# Patient Record
Sex: FEMALE | Race: BLACK OR AFRICAN AMERICAN
[De-identification: names, ages, dates, MRNs, and addresses within clinical notes are randomized per-mention and may not be internally consistent; named-entity substitution may affect disease eponyms.]

---

## 2016-06-29 VITALS — DIASTOLIC BLOOD PRESSURE: 74 MMHG | SYSTOLIC BLOOD PRESSURE: 138 MMHG

## 2017-02-13 ENCOUNTER — HOSPITAL ENCOUNTER (OUTPATIENT)
Dept: HOSPITAL 61 - KCIC MAMMO | Age: 62
Discharge: HOME | End: 2017-02-13
Attending: NURSE PRACTITIONER
Payer: COMMERCIAL

## 2017-02-13 DIAGNOSIS — Z12.31: Primary | ICD-10-CM

## 2017-02-13 NOTE — [facility]
Bilateral digital screening mammograms with CAD: 

HISTORY 

 

COMPARISON 

Comparison is made to previous studies dated 02/04/2016 and 02/10/2015. 

FINDINGS 

Breast density category B. 

The skin and nipples show no abnormalities. No abnormal lymph nodes are 

seen in the axilla. The breast parenchyma shows scattered fibroglandular 

density. There continue be calcified and calcify nodules bilaterally 

consistent with degenerating fibroadenoma. There are no new dominant 

masses, suspicious calcifications or architectural distortions. Benign 

appearing calcifications are present 

IMPRESSION 

No evidence of malignancy. Recommend routine annual mammographic 

screening. 

This study was interpreted with the benefit of Computerized Aided 

Detection (CAD). 

Mammography is not 100% sensitive in detecting breast cancer. Therefore, a

self breast exam and a clinical breast exam are very important. A negative

mammogram does not negate a clinically suspicious finding and should not 

result in a delay in biopsying a clinically suspicious abnormality. 

BI-RADS category 2. Benign. 

This patient's information has been entered into a reminder system for the

patient to be notified with the results of this examination and a target 

date for her next mammograms. 

 

Electronically signed by: Annmarie Lo MD (Feb 13, 2017 17:10:17)

## 2017-07-18 ENCOUNTER — HOSPITAL ENCOUNTER (EMERGENCY)
Dept: HOSPITAL 61 - ER | Age: 62
Discharge: HOME | End: 2017-07-18
Payer: COMMERCIAL

## 2017-07-18 VITALS — HEIGHT: 63 IN | WEIGHT: 235 LBS | BODY MASS INDEX: 41.64 KG/M2

## 2017-07-18 VITALS
SYSTOLIC BLOOD PRESSURE: 148 MMHG | SYSTOLIC BLOOD PRESSURE: 148 MMHG | DIASTOLIC BLOOD PRESSURE: 76 MMHG | DIASTOLIC BLOOD PRESSURE: 76 MMHG

## 2017-07-18 DIAGNOSIS — E11.9: ICD-10-CM

## 2017-07-18 DIAGNOSIS — Z88.5: ICD-10-CM

## 2017-07-18 DIAGNOSIS — I10: ICD-10-CM

## 2017-07-18 DIAGNOSIS — Z96.659: ICD-10-CM

## 2017-07-18 DIAGNOSIS — F41.9: ICD-10-CM

## 2017-07-18 DIAGNOSIS — E78.00: ICD-10-CM

## 2017-07-18 DIAGNOSIS — G44.209: Primary | ICD-10-CM

## 2017-07-18 PROCEDURE — 96374 THER/PROPH/DIAG INJ IV PUSH: CPT

## 2017-07-18 PROCEDURE — 96361 HYDRATE IV INFUSION ADD-ON: CPT

## 2017-07-18 PROCEDURE — 99285 EMERGENCY DEPT VISIT HI MDM: CPT

## 2017-07-18 PROCEDURE — 96375 TX/PRO/DX INJ NEW DRUG ADDON: CPT

## 2017-07-18 NOTE — PHYS DOC
Past Medical History


Past Medical History:  Anxiety, Diabetes-Type II, High Cholesterol, Hypertension


Past Surgical History:  Knee Replacement, Other


Additional Past Surgical Histo:  RIGHT KNEE SX, R&L BREAST BX; left knee


Alcohol Use:  Rarely


Drug Use:  None





Adult General


Chief Complaint


Chief Complaint:  HEADACHE





HPI


HPI





Is a pleasant 62-year-old female with a week long history of continuous 

headache. She describes a headache as throbbing and aching beginning at the 

base of her neck with radiation to the top of her scalp and temples bilaterally 

is not worse of life or sudden onset in nature. She denies any problems with 

vision, problems with speech, weakness in her upper or lower extremities, 

denies any numbness and tingling in her upper and lower extremities denies any 

direct trauma. Patient also denies any URI symptoms like runny nose cough 

congestion or ear pain ear drainage tinnitus or eye discomfort. Patient was 

seen by an ophthalmologist recently who prescribed an eyedrop for her. Her 

symptoms are not improved with his therapy. She denies any fevers, denies any 

chills, denies any nausea she does have some photophobia and audiophobia which 

does increase her pain. The pain is worsened by exertion and bending over. Pain 

presently is a 10 of 10. She mentioned in her history that her provider wanted 

to complete an MRI study of her brain although she had not gotten approval 

through his insurance company. She was hoping that they could be done today.





Review of Systems


Review of Systems





Constitutional: Denies fever or chills []


Eyes: Denies change in visual acuity, redness, or eye pain []


HENT: Denies nasal congestion or sore throat []


Respiratory: Denies cough or shortness of breath []


Cardiovascular: No additional information not addressed in HPI []


GI: Denies abdominal pain, denies vomiting bloody stools or diarrhea. But she 

does complain of intermittent nausea


: Denies dysuria or hematuria []


Musculoskeletal: Denies back pain or joint pain []


Integument: Denies rash or skin lesions []


Neurologic: sHe does complain of headache


Endocrine: Denies polyuria or polydipsia []





Current Medications


Current Medications





Current Medications








 Medications


  (Trade)  Dose


 Ordered  Sig/Faraz  Start Time


 Stop Time Status Last Admin


Dose Admin


 


 Dexamethasone


 Sodium Phosphate


  (Decadron)  10 mg  1X  ONCE  7/18/17 21:45


 7/18/17 21:50 DC 7/18/17 22:10


10 MG


 


 Diphenhydramine


 HCl


  (Benadryl)  50 mg  1X  ONCE  7/18/17 21:45


 7/18/17 21:50 DC 7/18/17 22:10


50 MG


 


 Ketorolac


 Tromethamine


  (Toradol)  30 mg  1X  ONCE  7/18/17 21:45


 7/18/17 21:50 DC 7/18/17 22:10


30 MG


 


 Ondansetron HCl


  (Zofran)  4 mg  1X  ONCE  7/18/17 21:45


 7/18/17 21:50 DC 7/18/17 22:10


4 MG


 


 Sodium Chloride  1,000 ml @ 


 1,000 mls/hr  1X  ONCE  7/18/17 21:45


 7/18/17 22:44 DC 7/18/17 22:10


1,000 MLS/HR











Allergies


Allergies





Allergies








Coded Allergies Type Severity Reaction Last Updated Verified


 


  codeine Adverse Reaction Intermediate VOMITING 6/9/15 No











Physical Exam


Physical Exam


This patient's vital signs were reviewed by me it is documented that she is 

mildly hypertensive but this is chronic in nature for this patient otherwise 

normal vital signs.


Constitutional: Well developed, well nourished, no acute distress, non-toxic 

appearance. []


HENT: Normocephalic, atraumatic, bilateral external ears normal, oropharynx 

moist, no oral exudates, nose normal. []


Eyes: PERRLA, EOMI, conjunctiva normal, no discharge. She does demonstrate 

arcus senilis but no mid-fixed pupil dilation patient has no tenderness to 

palpation over the temporal arteries.


Neck: Normal range of motion, supple, no stridor. She does have tenderness to 

palpation over the trapezius as it inserts onto the base of the skull on the 

left. It's pain is exactly is his experience in the past. There is no midline 

tenderness palpation [] 


Cardiovascular:Heart rate regular rhythm, no murmur []


Lungs & Thorax:  Bilateral breath sounds clear to auscultation []


Skin: Warm, dry, no erythema, no rash. [] 


Back: No tenderness, no CVA tenderness. [] 


Extremities: No tenderness, no cyanosis, no clubbing, ROM intact, no edema. [] 


Neurologic: Alert and oriented X 3, normal motor function, normal sensory 

function, no focal deficits noted. []


Psychologic: Affect normal, judgement normal, mood normal. []





Current Patient Data


Vital Signs





 Vital Signs








  Date Time  Temp Pulse Resp B/P (MAP) Pulse Ox O2 Delivery O2 Flow Rate FiO2


 


7/18/17 23:00  75 20 148/76 (100) 99 Room Air  


 


7/18/17 21:33 98.4       





 98.4       











Radiology/Procedures


Radiology/Procedures


[]





Course & Med Decision Making


Course & Med Decision Making


Pertinent Labs and Imaging studies reviewed. (See chart for details)


Upon patient arrival patient's history and physical exam revealed probably a 

tension-like headache. This is not worse of life and sudden onset she is a 

normal neuro exam initial presentation. Patient denied discussed needing an MRI 

at this time that she was going to get completed at this time. I believe there 

is no clinical indication for emergently during her ER visit today. Patient has 

agreed to allow me to try some IV medications nonnarcotic in nature to address 

her headache.





10 PM she has just received the medications





10:54 PM patient's are equal and markedly better. She is agreed to follow up 

with her primary care doctor to schedule attempted outpatient MRI and referral 

to neurology for her headaches. Again I do not believe she is suffering from an 

encephalopathy, or meningitis or infection of the CNS. I do not believe she is 

having aneurysm, subarachnoid hemorrhage, intracranial tumor, doubt glaucoma, 

temporal arteritis, trauma, subdural hematoma, patient does not demonstrate any 

signs of sinusitis or otitis media otitis externa or other ENT infection.





Impression: Tension headache, high blood pressure


Disposition: PCP follow-up with referral to neurology.


[]





Dragon Disclaimer


Dragon Disclaimer


This electronic medical record was generated, in whole or in part, using a 

voice recognition dictation system.





Departure


Departure


Impression:  


 Primary Impression:  


 Anxiety


 Additional Impression:  


 Tension headache


Disposition:  01 HOME, SELF-CARE


Condition:  IMPROVED


Referrals:  


NIRAV RUBIN (PCP)


Patient Instructions:  Hypertension, Tension Headache





Additional Instructions:  


Please return for any new or increasing symptoms, new focal neurologic deficits 

or if you have any questions or concerns.


Scripts


Ondansetron (ZOFRAN ODT) 4 Mg Tab.rapdis


4 MG PO BID Y for NAUSEA/VOMITING for 5 Days, #10 TAB


   Prov: FERMIN SLOAN MD         7/18/17 


Naproxen (NAPROSYN) 500 Mg Tablet


1 TAB PO BID, #14 TAB 1 Refill


   Prov: FERMIN SLOAN MD         7/18/17 


Diphenhydramine Hcl (BENADRYL) 25 Mg Capsule


1 CAP PO QHS, #30 CAP 1 Refill


   Prov: FERMIN SLOAN MD         7/18/17





Problem Qualifiers











FERMIN SLOAN MD Jul 18, 2017 22:59

## 2017-07-25 ENCOUNTER — HOSPITAL ENCOUNTER (OUTPATIENT)
Dept: HOSPITAL 61 - KCIC MRI | Age: 62
Discharge: HOME | End: 2017-07-25
Attending: FAMILY MEDICINE
Payer: COMMERCIAL

## 2017-07-25 DIAGNOSIS — R51: Primary | ICD-10-CM

## 2017-07-25 DIAGNOSIS — M54.12: ICD-10-CM

## 2017-07-25 PROCEDURE — 70551 MRI BRAIN STEM W/O DYE: CPT

## 2017-07-25 NOTE — [facility]
MRI of the brain without contrast 7/25/2017

 

Clinical History: Headache and neck pain for 2 weeks.

 

Technique: Unenhanced T1-weighted sagittal and axial, T2-weighted axial 

and coronal and FLAIR, gradient echo and diffusion-weighted axial images 

of the brain were obtained.

 

Findings: No previous imaging studies are available for comparison.

 

There is generalized parenchymal atrophy. Patchy and several small 

scattered areas of increased signal intensity are seen within the 

periventricular and subcortical white matter of both cerebral hemispheres 

on the FLAIR and T2-weighted images consistent with areas of mild small 

vessel ischemic disease. No acute parenchymal abnormality is seen. No 

extra-axial fluid collection is seen. There is no MRI evidence of acute 

ischemia/infarction.

 

A 2 cm mucous retention cyst is seen involving the left maxillary sinus. A

7 mm mucous retention cyst is seen involving the sphenoid sinus. Mild 

mucosal thickening in seen scattered throughout the paranasal sinuses. 

There are minimal bilateral mastoid effusions. Normal flow voids are seen 

within the major vascular structures surrounding the brain parenchyma.

 

Impression: No acute parenchymal abnormality is seen.

 

Electronically signed by: Ronnie Holcomb MD (7/25/2017 4:20 PM) [facility]

## 2017-10-29 ENCOUNTER — HOSPITAL ENCOUNTER (EMERGENCY)
Dept: HOSPITAL 61 - ER | Age: 62
Discharge: HOME | End: 2017-10-29
Payer: COMMERCIAL

## 2017-10-29 VITALS
DIASTOLIC BLOOD PRESSURE: 80 MMHG | SYSTOLIC BLOOD PRESSURE: 135 MMHG | DIASTOLIC BLOOD PRESSURE: 80 MMHG | SYSTOLIC BLOOD PRESSURE: 135 MMHG

## 2017-10-29 VITALS — WEIGHT: 235 LBS | HEIGHT: 63 IN | BODY MASS INDEX: 41.64 KG/M2

## 2017-10-29 DIAGNOSIS — F41.9: ICD-10-CM

## 2017-10-29 DIAGNOSIS — H57.12: Primary | ICD-10-CM

## 2017-10-29 DIAGNOSIS — Z88.5: ICD-10-CM

## 2017-10-29 DIAGNOSIS — E78.00: ICD-10-CM

## 2017-10-29 DIAGNOSIS — E11.36: ICD-10-CM

## 2017-10-29 DIAGNOSIS — I10: ICD-10-CM

## 2017-10-29 PROCEDURE — 99283 EMERGENCY DEPT VISIT LOW MDM: CPT

## 2017-10-29 NOTE — PHYS DOC
Past Medical History


Past Medical History:  Anxiety, Diabetes-Type II, High Cholesterol, Hypertension

, Other


Additional Past Medical Histor:  cataracts


Past Surgical History:  Knee Replacement, Other


Additional Past Surgical Histo:  RIGHT KNEE SX, R&L BREAST BX; left knee


Alcohol Use:  Rarely


Drug Use:  None





Adult General


Chief Complaint


Chief Complaint:  EYE PROBLEMS





HPI


HPI





Patient is a 62  year old email who presents with left eye pain onset 2 hours 

prior to arrival questionable decreased vision but was able to drive herself 

here. Sees an eye doctor and takes antihistamine drops when necessary. Hasn't 

taken them for several months. Denies eye drainage or trauma.





Review of Systems


Review of Systems





Constitutional: Denies fever or chills []


Eyes: Denies change in visual acuity, redness, or eye pain []


HENT: Denies nasal congestion or sore throat []


Respiratory: Denies cough or shortness of breath []


Cardiovascular: No additional information not addressed in HPI []


GI: Denies abdominal pain, nausea, vomiting, bloody stools or diarrhea []


: Denies dysuria or hematuria []


Musculoskeletal: Denies back pain or joint pain []


Integument: Denies rash or skin lesions []


Neurologic: Denies headache, focal weakness or sensory changes []


Endocrine: Denies polyuria or polydipsia []





Current Medications


Current Medications





Current Medications








 Medications


  (Trade)  Dose


 Ordered  Sig/Faraz  Start Time


 Stop Time Status Last Admin


Dose Admin


 


 Fluorescein Sodium


  (Ful-Samantha)  1 strip  STK-MED ONCE  10/29/17 04:07


 10/29/17 04:08 DC  


 


 


 Tetracaine HCl


  (Tetracaine)  1 drop  1X  ONCE  10/29/17 04:30


 10/29/17 04:31  10/29/17 04:09


1 DROP











Allergies


Allergies





Allergies








Coded Allergies Type Severity Reaction Last Updated Verified


 


  codeine Adverse Reaction Intermediate VOMITING 6/9/15 No











Physical Exam


Physical Exam





Constitutional: Well developed, well nourished, no acute distress, non-toxic 

appearance. []


HENT: Normocephalic, atraumatic, bilateral external ears normal, oropharynx 

moist, no oral exudates, nose normal. []


Eyes: PERRLA, EOMI, conjunctiva normal, no discharge. Left eye shows an 

injected conjunctiva. Intraocular pressures were measured normal at 14 and 

equal. Fluorescein strip and dye instilled no uptake. Anterior chamber was 

clear no foreign body seen. Patient's symptoms resolved when the numbing 

medicine when in. No photophobia. Able to count fingers.[] 


Neck: Normal range of motion, no tenderness, supple, no stridor. [] 


Cardiovascular:Heart rate regular rhythm, no murmur []


Lungs & Thorax:  Bilateral breath sounds clear to auscultation []


Abdomen: Bowel sounds normal, soft, no tenderness, no masses, no pulsatile 

masses. [] 


Skin: Warm, dry, no erythema, no rash. [] 


Back: No tenderness, no CVA tenderness. [] 


Extremities: No tenderness, no cyanosis, no clubbing, ROM intact, no edema. [] 


Neurologic: Alert and oriented X 3, normal motor function, normal sensory 

function, no focal deficits noted. []


Psychologic: Affect normal, judgement normal, mood normal. []





Current Patient Data


Vital Signs





 Vital Signs








  Date Time  Temp Pulse Resp B/P (MAP) Pulse Ox O2 Delivery O2 Flow Rate FiO2


 


10/29/17 03:35 98.4 88 22  98 Room Air  





 98.4       











EKG


EKG


[]





Radiology/Procedures


Radiology/Procedures


[]





Course & Med Decision Making


Course & Med Decision Making


Pertinent Labs and Imaging studies reviewed. (See chart for details)





Patient has diabetes this may represent retinopathy or floaters and I recommend 

she get follow-up no later than Monday with her primary eye doctor.[]





Dragon Disclaimer


Dragon Disclaimer


This electronic medical record was generated, in whole or in part, using a 

voice recognition dictation system.





Departure


Departure


Impression:  


 Primary Impression:  


 Left eye pain


Disposition:  01 HOME, SELF-CARE


Condition:  STABLE


Referrals:  


GAB KEE MD (PCP)











DAVID HA MD Oct 29, 2017 04:18

## 2017-11-16 ENCOUNTER — HOSPITAL ENCOUNTER (OUTPATIENT)
Dept: HOSPITAL 61 - LAB | Age: 62
Discharge: HOME | End: 2017-11-16
Attending: PSYCHIATRY & NEUROLOGY
Payer: COMMERCIAL

## 2017-11-16 DIAGNOSIS — G43.019: Primary | ICD-10-CM

## 2017-11-16 LAB
ALBUMIN SERPL-MCNC: 3.8 G/DL (ref 3.4–5)
ALBUMIN/GLOB SERPL: 1 {RATIO} (ref 1–1.7)
ALP SERPL-CCNC: 112 U/L (ref 46–116)
ALT SERPL-CCNC: 24 U/L (ref 14–59)
ANION GAP SERPL CALC-SCNC: 6 MMOL/L (ref 6–14)
ANISOCYTOSIS BLD QL SMEAR: SLIGHT
AST SERPL-CCNC: 12 U/L (ref 15–37)
BASOPHILS # BLD AUTO: 0 X10^3/UL (ref 0–0.2)
BASOPHILS NFR BLD: 1 % (ref 0–3)
BILIRUB SERPL-MCNC: 0.4 MG/DL (ref 0.2–1)
BUN SERPL-MCNC: 13 MG/DL (ref 7–20)
BUN/CREAT SERPL: 19 (ref 6–20)
CALCIUM SERPL-MCNC: 9.5 MG/DL (ref 8.5–10.1)
CHLORIDE SERPL-SCNC: 105 MMOL/L (ref 98–107)
CO2 SERPL-SCNC: 30 MMOL/L (ref 21–32)
CREAT SERPL-MCNC: 0.7 MG/DL (ref 0.6–1)
EOSINOPHIL NFR BLD: 2 % (ref 0–3)
ERYTHROCYTE [DISTWIDTH] IN BLOOD BY AUTOMATED COUNT: 18.7 % (ref 11.5–14.5)
GFR SERPLBLD BASED ON 1.73 SQ M-ARVRAT: 101.9 ML/MIN
GLOBULIN SER-MCNC: 4 G/DL (ref 2.2–3.8)
GLUCOSE SERPL-MCNC: 141 MG/DL (ref 70–99)
HCT VFR BLD CALC: 35.3 % (ref 36–47)
HGB BLD-MCNC: 10.8 G/DL (ref 12–15.5)
HYPOCHROMIA BLD QL SMEAR: (no result)
LYMPHOCYTES # BLD: 3.8 X10^3/UL (ref 1–4.8)
LYMPHOCYTES NFR BLD AUTO: 52 % (ref 24–48)
MCH RBC QN AUTO: 21 PG (ref 25–35)
MCHC RBC AUTO-ENTMCNC: 31 G/DL (ref 31–37)
MCV RBC AUTO: 69 FL (ref 79–100)
MICROCYTES BLD QL SMEAR: (no result)
MONOCYTES NFR BLD: 7 % (ref 0–9)
NEUTROPHILS NFR BLD AUTO: 39 % (ref 31–73)
OVALOCYTES BLD QL SMEAR: (no result)
PLATELET # BLD AUTO: 223 X10^3/UL (ref 140–400)
PLATELET # BLD EST: ADEQUATE 10*3/UL
POIKILOCYTOSIS BLD QL SMEAR: SLIGHT
POTASSIUM SERPL-SCNC: 3.7 MMOL/L (ref 3.5–5.1)
PROT SERPL-MCNC: 7.8 G/DL (ref 6.4–8.2)
RBC # BLD AUTO: 5.15 X10^6/UL (ref 3.5–5.4)
SODIUM SERPL-SCNC: 141 MMOL/L (ref 136–145)
TARGETS BLD QL SMEAR: (no result)
WBC # BLD AUTO: 7.3 X10^3/UL (ref 4–11)

## 2017-11-16 PROCEDURE — 85025 COMPLETE CBC W/AUTO DIFF WBC: CPT

## 2017-11-16 PROCEDURE — 85651 RBC SED RATE NONAUTOMATED: CPT

## 2017-11-16 PROCEDURE — 80053 COMPREHEN METABOLIC PANEL: CPT

## 2017-11-16 PROCEDURE — 36415 COLL VENOUS BLD VENIPUNCTURE: CPT

## 2018-04-10 ENCOUNTER — HOSPITAL ENCOUNTER (OUTPATIENT)
Dept: HOSPITAL 61 - KCIC MAMMO | Age: 63
Discharge: HOME | End: 2018-04-10
Attending: FAMILY MEDICINE
Payer: COMMERCIAL

## 2018-04-10 DIAGNOSIS — Z12.31: Primary | ICD-10-CM

## 2018-04-10 PROCEDURE — 77067 SCR MAMMO BI INCL CAD: CPT

## 2018-07-26 ENCOUNTER — HOSPITAL ENCOUNTER (EMERGENCY)
Dept: HOSPITAL 61 - ER | Age: 63
LOS: 1 days | Discharge: HOME | End: 2018-07-27
Payer: COMMERCIAL

## 2018-07-26 DIAGNOSIS — R00.2: Primary | ICD-10-CM

## 2018-07-26 DIAGNOSIS — E11.9: ICD-10-CM

## 2018-07-26 DIAGNOSIS — Z88.5: ICD-10-CM

## 2018-07-26 DIAGNOSIS — I10: ICD-10-CM

## 2018-07-26 PROCEDURE — 99285 EMERGENCY DEPT VISIT HI MDM: CPT

## 2018-07-26 PROCEDURE — 82553 CREATINE MB FRACTION: CPT

## 2018-07-26 PROCEDURE — 36415 COLL VENOUS BLD VENIPUNCTURE: CPT

## 2018-07-26 PROCEDURE — 80053 COMPREHEN METABOLIC PANEL: CPT

## 2018-07-26 PROCEDURE — 85025 COMPLETE CBC W/AUTO DIFF WBC: CPT

## 2018-07-26 PROCEDURE — 83735 ASSAY OF MAGNESIUM: CPT

## 2018-07-26 PROCEDURE — 93005 ELECTROCARDIOGRAM TRACING: CPT

## 2018-07-26 PROCEDURE — 84484 ASSAY OF TROPONIN QUANT: CPT

## 2018-07-27 VITALS — DIASTOLIC BLOOD PRESSURE: 61 MMHG | SYSTOLIC BLOOD PRESSURE: 133 MMHG

## 2018-07-27 LAB
ADD MAN DIFF?: NO
ALBUMIN SERPL-MCNC: 3.6 G/DL (ref 3.4–5)
ALBUMIN/GLOB SERPL: 0.9 {RATIO} (ref 1–1.7)
ALP SERPL-CCNC: 100 U/L (ref 46–116)
ALT (SGPT): 21 U/L (ref 14–59)
ANION GAP SERPL CALC-SCNC: 7 MMOL/L (ref 6–14)
ANISOCYTOSIS: (no result)
AST SERPL-CCNC: 14 U/L (ref 15–37)
BASO #: 0.1 X10^3/UL (ref 0–0.2)
BASO %: 1 % (ref 0–3)
BLOOD UREA NITROGEN: 16 MG/DL (ref 7–20)
BUN/CREAT SERPL: 23 (ref 6–20)
CALCIUM: 9.7 MG/DL (ref 8.5–10.1)
CHLORIDE: 105 MMOL/L (ref 98–107)
CK SERPL-CCNC: 131 U/L (ref 26–192)
CKMB INDEX: (no result) % (ref 0–4)
CKMB MASS: < 0.5 NG/ML (ref 0–3.6)
CO2 SERPL-SCNC: 31 MMOL/L (ref 21–32)
CREAT SERPL-MCNC: 0.7 MG/DL (ref 0.6–1)
EOS #: 0.2 X10^3/UL (ref 0–0.7)
EOS %: 2 % (ref 0–3)
GFR SERPLBLD BASED ON 1.73 SQ M-ARVRAT: 101.6 ML/MIN
GLOBULIN SER-MCNC: 4 G/DL (ref 2.2–3.8)
GLUCOSE SERPL-MCNC: 135 MG/DL (ref 70–99)
HCG SERPL-ACNC: 8 X10^3/UL (ref 4–11)
HEMATOCRIT: 34.2 % (ref 36–47)
HEMOGLOBIN: 10.8 G/DL (ref 12–15.5)
HYPOCHROMIA: (no result)
LYMPH #: 3.8 X10^3/UL (ref 1–4.8)
LYMPH %: 47 % (ref 24–48)
MAGNESIUM: 2 MG/DL (ref 1.8–2.4)
MEAN CORPUSCULAR HEMOGLOBIN: 21 PG (ref 25–35)
MEAN CORPUSCULAR HGB CONC: 32 G/DL (ref 31–37)
MEAN CORPUSCULAR VOLUME: 67 FL (ref 79–100)
MICROCYTOSIS: (no result)
MONO #: 0.5 X10^3/UL (ref 0–1.1)
MONO %: 6 % (ref 0–9)
NEUT #: 3.5 X10^3UL (ref 1.8–7.7)
NEUT %: 44 % (ref 31–73)
PLATELET COUNT: 229 X10^3/UL (ref 140–400)
PLT ESTIMATE: ADEQUATE
POTASSIUM SERPL-SCNC: 3.9 MMOL/L (ref 3.5–5.1)
RED BLOOD COUNT: 5.07 X10^6/UL (ref 3.5–5.4)
RED CELL DISTRIBUTION WIDTH: 20.1 % (ref 11.5–14.5)
SODIUM: 143 MMOL/L (ref 136–145)
TOTAL BILIRUBIN: 0.2 MG/DL (ref 0.2–1)
TOTAL PROTEIN: 7.6 G/DL (ref 6.4–8.2)
TROPONINI: < 0.017 NG/ML (ref 0–0.06)

## 2018-09-10 ENCOUNTER — HOSPITAL ENCOUNTER (OUTPATIENT)
Dept: HOSPITAL 61 - KCIC | Age: 63
Discharge: HOME | End: 2018-09-10
Attending: NURSE PRACTITIONER
Payer: COMMERCIAL

## 2018-09-10 DIAGNOSIS — M53.3: Primary | ICD-10-CM

## 2018-09-10 DIAGNOSIS — I10: ICD-10-CM

## 2018-09-10 DIAGNOSIS — Z88.5: ICD-10-CM

## 2018-09-10 DIAGNOSIS — G43.019: ICD-10-CM

## 2018-09-10 DIAGNOSIS — E11.9: ICD-10-CM

## 2018-09-10 DIAGNOSIS — I87.8: ICD-10-CM

## 2018-09-10 DIAGNOSIS — Z96.652: ICD-10-CM

## 2018-09-10 DIAGNOSIS — E78.00: ICD-10-CM

## 2018-09-10 PROCEDURE — 72220 X-RAY EXAM SACRUM TAILBONE: CPT

## 2018-09-10 NOTE — [facility]
3 view sacrum

 

HISTORY: Coccyx pain for one week. No known injury.

 

FINDINGS:

Mild irregularity of the distal sacrum on the lateral view. Note is made 

of a transitional vertebral body at the lumbosacral junction.

 

Calcifications in the pelvis may be due to uterine fibroid. There are also

phleboliths and vascular calcification.

 

IMPRESSION: Mild irregularity of the distal sacrum on the lateral view, 

could be a nondisplaced fracture. CT or MR scan of the sacrum could 

further evaluate.

 

Electronically signed by: Henry Woods MD (9/10/2018 4:50 PM) [facility]

## 2018-09-28 ENCOUNTER — HOSPITAL ENCOUNTER (INPATIENT)
Dept: HOSPITAL 61 - ER | Age: 63
LOS: 2 days | Discharge: HOME | DRG: 149 | End: 2018-09-30
Attending: INTERNAL MEDICINE | Admitting: INTERNAL MEDICINE
Payer: COMMERCIAL

## 2018-09-28 VITALS — BODY MASS INDEX: 41.65 KG/M2 | WEIGHT: 235.06 LBS | HEIGHT: 63 IN

## 2018-09-28 VITALS — SYSTOLIC BLOOD PRESSURE: 140 MMHG | DIASTOLIC BLOOD PRESSURE: 75 MMHG

## 2018-09-28 VITALS — SYSTOLIC BLOOD PRESSURE: 134 MMHG | DIASTOLIC BLOOD PRESSURE: 67 MMHG

## 2018-09-28 VITALS — SYSTOLIC BLOOD PRESSURE: 127 MMHG | DIASTOLIC BLOOD PRESSURE: 59 MMHG

## 2018-09-28 DIAGNOSIS — Z79.899: ICD-10-CM

## 2018-09-28 DIAGNOSIS — H83.09: Primary | ICD-10-CM

## 2018-09-28 DIAGNOSIS — Z88.8: ICD-10-CM

## 2018-09-28 DIAGNOSIS — K59.00: ICD-10-CM
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
## 2018-09-28 DIAGNOSIS — D64.9: ICD-10-CM

## 2018-09-28 DIAGNOSIS — I10: ICD-10-CM

## 2018-09-28 DIAGNOSIS — F41.9: ICD-10-CM
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
## 2018-09-28 DIAGNOSIS — Z82.49: ICD-10-CM
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
## 2018-09-28 DIAGNOSIS — E11.9: ICD-10-CM

## 2018-09-28 DIAGNOSIS — M54.5: ICD-10-CM

## 2018-09-28 LAB
ALBUMIN SERPL-MCNC: 3.1 G/DL (ref 3.4–5)
ALBUMIN/GLOB SERPL: 0.8 {RATIO} (ref 1–1.7)
ALP SERPL-CCNC: 93 U/L (ref 46–116)
ALT SERPL-CCNC: 22 U/L (ref 14–59)
ANION GAP SERPL CALC-SCNC: 8 MMOL/L (ref 6–14)
ANISOCYTOSIS BLD QL SMEAR: SLIGHT
APTT PPP: YELLOW S
AST SERPL-CCNC: 11 U/L (ref 15–37)
BACTERIA #/AREA URNS HPF: 0 /HPF
BASOPHILS # BLD AUTO: 0.1 X10^3/UL (ref 0–0.2)
BASOPHILS NFR BLD: 2 % (ref 0–3)
BILIRUB SERPL-MCNC: 0.4 MG/DL (ref 0.2–1)
BILIRUB UR QL STRIP: NEGATIVE
BUN SERPL-MCNC: 12 MG/DL (ref 7–20)
BUN/CREAT SERPL: 24 (ref 6–20)
CALCIUM SERPL-MCNC: 9.4 MG/DL (ref 8.5–10.1)
CHLORIDE SERPL-SCNC: 105 MMOL/L (ref 98–107)
CO2 SERPL-SCNC: 30 MMOL/L (ref 21–32)
CREAT SERPL-MCNC: 0.5 MG/DL (ref 0.6–1)
EOSINOPHIL NFR BLD: 0 X10^3/UL (ref 0–0.7)
EOSINOPHIL NFR BLD: 1 % (ref 0–3)
ERYTHROCYTE [DISTWIDTH] IN BLOOD BY AUTOMATED COUNT: 18.8 % (ref 11.5–14.5)
FIBRINOGEN PPP-MCNC: CLEAR MG/DL
GFR SERPLBLD BASED ON 1.73 SQ M-ARVRAT: 149.8 ML/MIN
GLOBULIN SER-MCNC: 4.1 G/DL (ref 2.2–3.8)
GLUCOSE SERPL-MCNC: 162 MG/DL (ref 70–99)
HCT VFR BLD CALC: 32.6 % (ref 36–47)
HGB BLD-MCNC: 10.7 G/DL (ref 12–15.5)
HYPOCHROMIA BLD QL SMEAR: (no result)
LYMPHOCYTES # BLD: 2.3 X10^3/UL (ref 1–4.8)
LYMPHOCYTES NFR BLD AUTO: 40 % (ref 24–48)
MCH RBC QN AUTO: 22 PG (ref 25–35)
MCHC RBC AUTO-ENTMCNC: 33 G/DL (ref 31–37)
MCV RBC AUTO: 66 FL (ref 79–100)
MICROCYTES BLD QL SMEAR: SLIGHT
MONO #: 0.3 X10^3/UL (ref 0–1.1)
MONOCYTES NFR BLD: 6 % (ref 0–9)
NEUT #: 3 X10^3UL (ref 1.8–7.7)
NEUTROPHILS NFR BLD AUTO: 52 % (ref 31–73)
NITRITE UR QL STRIP: NEGATIVE
PH UR STRIP: 6 [PH]
PLATELET # BLD AUTO: 214 X10^3/UL (ref 140–400)
PLATELET # BLD EST: ADEQUATE 10*3/UL
POTASSIUM SERPL-SCNC: 3.5 MMOL/L (ref 3.5–5.1)
PROT SERPL-MCNC: 7.2 G/DL (ref 6.4–8.2)
PROT UR STRIP-MCNC: NEGATIVE MG/DL
RBC # BLD AUTO: 4.95 X10^6/UL (ref 3.5–5.4)
RBC #/AREA URNS HPF: 0 /HPF (ref 0–2)
SODIUM SERPL-SCNC: 143 MMOL/L (ref 136–145)
SQUAMOUS #/AREA URNS LPF: (no result) /LPF
TARGETS BLD QL SMEAR: (no result)
UROBILINOGEN UR-MCNC: 1 MG/DL
WBC # BLD AUTO: 5.8 X10^3/UL (ref 4–11)
WBC #/AREA URNS HPF: (no result) /HPF (ref 0–4)

## 2018-09-28 PROCEDURE — 93005 ELECTROCARDIOGRAM TRACING: CPT

## 2018-09-28 PROCEDURE — 71045 X-RAY EXAM CHEST 1 VIEW: CPT

## 2018-09-28 PROCEDURE — 81001 URINALYSIS AUTO W/SCOPE: CPT

## 2018-09-28 PROCEDURE — 96372 THER/PROPH/DIAG INJ SC/IM: CPT

## 2018-09-28 PROCEDURE — 96360 HYDRATION IV INFUSION INIT: CPT

## 2018-09-28 PROCEDURE — 85025 COMPLETE CBC W/AUTO DIFF WBC: CPT

## 2018-09-28 PROCEDURE — 70450 CT HEAD/BRAIN W/O DYE: CPT

## 2018-09-28 PROCEDURE — 82962 GLUCOSE BLOOD TEST: CPT

## 2018-09-28 PROCEDURE — 84484 ASSAY OF TROPONIN QUANT: CPT

## 2018-09-28 PROCEDURE — 80053 COMPREHEN METABOLIC PANEL: CPT

## 2018-09-28 PROCEDURE — 36415 COLL VENOUS BLD VENIPUNCTURE: CPT

## 2018-09-28 PROCEDURE — 84443 ASSAY THYROID STIM HORMONE: CPT

## 2018-09-28 PROCEDURE — 93880 EXTRACRANIAL BILAT STUDY: CPT

## 2018-09-28 RX ADMIN — BACITRACIN SCH MLS/HR: 5000 INJECTION, POWDER, FOR SOLUTION INTRAMUSCULAR at 15:36

## 2018-09-28 RX ADMIN — MECLIZINE SCH MG: 12.5 TABLET ORAL at 15:52

## 2018-09-28 RX ADMIN — ATORVASTATIN CALCIUM SCH MG: 20 TABLET, FILM COATED ORAL at 22:47

## 2018-09-28 RX ADMIN — MECLIZINE SCH MG: 12.5 TABLET ORAL at 20:23

## 2018-09-28 RX ADMIN — BACITRACIN SCH MLS/HR: 5000 INJECTION, POWDER, FOR SOLUTION INTRAMUSCULAR at 22:47

## 2018-09-28 NOTE — RAD
EXAM: CHEST 1 VIEW 

 

History: Nausea, dizziness 

 

COMPARISON: 6/29/2016

 

TECHNIQUE: Single portable radiograph of the chest

 

FINDINGS:  The cardiac silhouette is unremarkable. The lungs are clear 

bilaterally. The costophrenic sulci are clear and well demarcated.

 

IMPRESSION:  No radiographic evidence of an acute cardiopulmonary process.

 

 

Electronically signed by: Benito Aly MD (9/28/2018 12:28 PM) [id number]

## 2018-09-28 NOTE — EKG
[facility]

              [address] [place] [address]

Test Date:    2018               Test Time:    11:43:24

Pat Name:     ISABEL SEYMOUR            Department:   

Patient ID:   [id number]           Room:          

Gender:       F                        Technician:   

:          [id number]               Requested By: NANCY MODI

Order Number: [id number]           Reading MD:   Chacorta Page

                                 Measurements

Intervals                              Axis          

Rate:         63                       P:            34

AK:           180                      QRS:          -2

QRSD:         82                       T:            23

QT:           456                                    

QTc:          470                                    

                           Interpretive Statements

SINUS RHYTHM

LEFTWARD AXIS

NON SPECIFIC T ABNORMALITY

BORDERLINE ECG



Electronically Signed On 10-1-2018 12:23:35 CDT by Chacorta Page

## 2018-09-28 NOTE — RAD
CT HEAD WO CONTRAST

 

Indication: ha dizzy htn no prev 

 

Exposure: One or more of the following individualized dose reduction 

techniques were utilized for this examination:  1. Automated exposure 

control  2. Adjustment of the mA and/or kV according to patient size  3. 

Use of iterative reconstruction technique.

 

Comparison: None are available.

Contrast: None

 

FINDINGS:

Posterior fossa is unremarkable.

No evidence of acute intracranial hemorrhage or abnormal extra-axial fluid

collection.

No evidence of mass effect or midline shift.

 

Mildly prominent ventricles and sulci compatible with atrophy. Mild 

arterial calcifications. 

 

Visualized orbits are unremarkable.

Rounded opacity in the partially seen left maxillary sinus compatible with

a polyp or mucous retention cyst. Smaller similar structure identified in 

the right sphenoid sinus.

No acute calvarial abnormality.

 

Impression:

1. Negative for acute intracranial hemorrhage or mass effect.

2. Left maxillary right sphenoid sinus nodules likely polyps or mucous 

retention cysts. Polypoid mucosal thickening less likely.

 

Electronically signed by: Henry Woods MD (9/28/2018 12:22 PM) [facility]

## 2018-09-28 NOTE — HP
ADMIT DATE:  09/28/2018



CHIEF COMPLAINT:  Dizziness.



HISTORY OF PRESENT ILLNESS:  The patient is a pleasant 63-year-old female who

presented to the ER with dizziness.  They gave her some Antivert and fluids. 

They thought she is going to able to get home, but then, she stood up and got

dizzy and nauseated.  I discussed the case with ER physician.  We decided to go

ahead admit her and consult Dr. Vázquez for a second opinion.



PAST MEDICAL HISTORY:  Diabetes, hypertension, cataracts, knee surgery, breast

biopsy.



ALLERGIES:  CODEINE.



FAMILY HISTORY:  Hypertension.



SOCIAL HISTORY:  She is retired.  She does not drink, smoke or take drugs.  Has

2 daughters and 1 son.



MEDICATIONS:  Reviewed, please refer to the MRAD.



REVIEW OF SYSTEMS:

GENERAL:  No history of weight change, weakness or fevers.

SKIN:  No bruising, hair changes or rashes.

EYES:  No blurred, double or loss of vision.

NOSE AND THROAT:  No history of nosebleeds, hoarseness or sore throat.

HEART:  No history of palpitations, chest pain or shortness of breath on

exertion.

LUNGS:  Denies cough, hemoptysis, wheezing or shortness of breath.

GASTROINTESTINAL:  Denies changes in appetite, nausea, vomiting, diarrhea or

constipation.

GENITOURINARY:  No history of frequency, urgency, hesitancy or nocturia.

NEUROLOGIC:  She complains of dizziness.

PSYCHIATRIC:  No history of panic, anxiety or depression.

ENDOCRINE:  No history of heat or cold intolerance, polyuria or polydipsia.

EXTREMITIES:  Denies muscle weakness, joint pain, pain on walking or stiffness.



PHYSICAL EXAMINATION:

VITAL SIGNS:  Temperature afebrile, pulse 70, respirations 18, blood pressure

134/67, O2 sat 99% on room air.

GENERAL:  She is alert, cooperative.

HEART:  Distant S1, S2.

ABDOMEN:  Soft, a little distended.

EXTREMITIES:  Trace edema.

SKIN:  No obvious rashes.

ENDOCRINE:  No thyromegaly.

LYMPHATICS:  No cervical nodes.

HEMATOPOIETIC:  No bruising.



LABORATORY DATA:  Hematology:  Her hemoglobin is a little low at 10.7, but

otherwise, her CBC is normal.  Electrolytes are normal other than a slightly low

creatinine of 0.5, glucose is a little high at 162 and troponin is 0.  CT of the

head was negative other than some maxillary polyps and maybe sinusitis.  Chest

x-ray was negative.  Urinalysis negative.



ASSESSMENT AND PLAN:  Vertigo, suspect vestibulitis with the inner ear

disturbance.  The patient has been admitted.  We will give her p.r.n. Antivert,

consult Dr. Vázquez.  I suspect she will get an MRI of the brain and we will

await Dr. Vázquez to opinion on that.  For now, we will get her on her home

medications, some IV fluids, p.r.n. meclizine.

 



______________________________

SINDY APPIAH DO



DR:  TOMMY/zoya  JOB#:  [id number] / [id number]

DD:  09/28/2018 18:08  DT:  09/28/2018 18:38



NIRAV Connolly

## 2018-09-28 NOTE — PHYS DOC
Past Medical History


Past Medical History:  Diabetes-Type II, Hypertension


Additional Past Medical Histor:  cataracts


Past Surgical History:  No Surgical History


Additional Past Surgical Histo:  RIGHT KNEE SX, R&L BREAST BX; left knee


Alcohol Use:  None


Drug Use:  None





Adult General


Chief Complaint


Chief Complaint:  DIZZY/LIGHT HEADED





HPI


HPI





Patient is a 63  year old diabetic who presents with intermittent dizziness 

upon waking today. Dizziness is described as being off balance is associated 

with nausea. Symptoms are significant improved with remaining still and are 

worse when standing and moving head.. Patient states symptoms first noticed 

upon awaking arising to stand. Patient fell backwards landing back on the bed. 

Denies headache, tinnitus, loss of hearing. Denies neck pain, palpitations, 

chest pain or shortness of breath. No fever, chills, sweats. Abdominal pain, 

flank pain, constipation or diarrhea. No range of motion vision, or focal 

extremity weakness or loss of sensation. No other acute symptoms or complaints.

  []





Review of Systems


Review of Systems


Review symptoms as per history of present illness. All other review symptoms 

are negative.


All other systems were reviewed and found to be within normal limits, except as 

documented in this note.





Current Medications


Current Medications





Current Medications








 Medications


  (Trade)  Dose


 Ordered  Sig/Faraz  Start Time


 Stop Time Status Last Admin


Dose Admin


 


 Lorazepam


  (Ativan)  1 mg  1X  ONCE  9/28/18 13:00


 9/28/18 13:01 DC 9/28/18 12:53


1 MG











Allergies


Allergies





Allergies








Coded Allergies Type Severity Reaction Last Updated Verified


 


  codeine Adverse Reaction Intermediate VOMITING 6/9/15 No











Physical Exam


Physical Exam





Constitutional: Well developed, well nourished, no acute distress, non-toxic 

appearance. []


HENT: Normocephalic, atraumatic, bilateral external ears normal, nose normal. []


Eyes: PERRLA, EOMI, conjunctiva normal, no discharge. [] 


Neck: Normal range of motion, no tenderness. [] 


Cardiovascular:Heart rate regular rhythm, no murmur []


Lungs & Thorax:  Bilateral breath sounds clear. []


Abdomen: Bowel sounds normal, soft, no tenderness. [] 


Skin: Warm, dry. [] 


Back: No tenderness. [] 


Extremities: No tenderness, no cyanosis, no clubbing, ROM intact, no edema. [] 


Neurologic: Alert and oriented X 3, normal motor function, normal sensory 

function, no focal deficits noted. []


Psychologic: Affect normal, judgement normal, mood normal. []





Current Patient Data


Vital Signs





 Vital Signs








  Date Time  Temp Pulse Resp B/P (MAP) Pulse Ox O2 Delivery O2 Flow Rate FiO2


 


9/28/18 11:15 97.7 67 22 167/77 (107) 99 Room Air  





 97.7       








Lab Values





 Laboratory Tests








Test


 9/28/18


10:46 9/28/18


12:22


 


White Blood Count


 5.8 x10^3/uL


(4.0-11.0) 





 


Red Blood Count


 4.95 x10^6/uL


(3.50-5.40) 





 


Hemoglobin


 10.7 g/dL


(12.0-15.5)  L 





 


Hematocrit


 32.6 %


(36.0-47.0)  L 





 


Mean Corpuscular Volume


 66 fL ()


L 





 


Mean Corpuscular Hemoglobin


 22 pg (25-35)


L 





 


Mean Corpuscular Hemoglobin


Concent 33 g/dL


(31-37) 





 


Red Cell Distribution Width


 18.8 %


(11.5-14.5)  H 





 


Platelet Count


 214 x10^3/uL


(140-400) 





 


Neutrophils (%) (Auto) 52 % (31-73)   


 


Lymphocytes (%) (Auto) 40 % (24-48)   


 


Monocytes (%) (Auto) 6 % (0-9)   


 


Eosinophils (%) (Auto) 1 % (0-3)   


 


Basophils (%) (Auto) 2 % (0-3)   


 


Neutrophils # (Auto)


 3.0 x10^3uL


(1.8-7.7) 





 


Lymphocytes # (Auto)


 2.3 x10^3/uL


(1.0-4.8) 





 


Monocytes # (Auto)


 0.3 x10^3/uL


(0.0-1.1) 





 


Eosinophils # (Auto)


 0.0 x10^3/uL


(0.0-0.7) 





 


Basophils # (Auto)


 0.1 x10^3/uL


(0.0-0.2) 





 


Platelet Estimate Pending   


 


Sodium Level


 143 mmol/L


(136-145) 





 


Potassium Level


 3.5 mmol/L


(3.5-5.1) 





 


Chloride Level


 105 mmol/L


() 





 


Carbon Dioxide Level


 30 mmol/L


(21-32) 





 


Anion Gap 8 (6-14)   


 


Blood Urea Nitrogen


 12 mg/dL


(7-20) 





 


Creatinine


 0.5 mg/dL


(0.6-1.0)  L 





 


Estimated GFR


(Cockcroft-Gault) 149.8  


 





 


BUN/Creatinine Ratio 24 (6-20)  H 


 


Glucose Level


 162 mg/dL


(70-99)  H 





 


Calcium Level


 9.4 mg/dL


(8.5-10.1) 





 


Total Bilirubin


 0.4 mg/dL


(0.2-1.0) 





 


Aspartate Amino Transferase


(AST) 11 U/L (15-37)


L 





 


Alanine Aminotransferase (ALT)


 22 U/L (14-59)


 





 


Alkaline Phosphatase


 93 U/L


() 





 


Troponin I Quantitative


 < 0.017 ng/mL


(0.000-0.055) 





 


Total Protein


 7.2 g/dL


(6.4-8.2) 





 


Albumin


 3.1 g/dL


(3.4-5.0)  L 





 


Albumin/Globulin Ratio


 0.8 (1.0-1.7)


L 





 


Thyroid Stimulating Hormone


(TSH) 0.024 uIU/mL


(0.358-3.74)  L 





 


Urine Collection Type  Unknown  


 


Urine Color  Yellow  


 


Urine Clarity  Clear  


 


Urine pH  6.0  


 


Urine Specific Gravity  1.025  


 


Urine Protein


 


 Negative mg/dL


(NEG-TRACE)


 


Urine Glucose (UA)


 


 Negative mg/dL


(NEG)


 


Urine Ketones (Stick)


 


 Negative mg/dL


(NEG)


 


Urine Blood


 


 Negative (NEG)





 


Urine Nitrite


 


 Negative (NEG)





 


Urine Bilirubin


 


 Negative (NEG)





 


Urine Urobilinogen Dipstick


 


 1.0 mg/dL (0.2


mg/dL)


 


Urine Leukocyte Esterase


 


 Negative (NEG)





 


Urine RBC  0 /HPF (0-2)  


 


Urine WBC


 


 1-4 /HPF (0-4)





 


Urine Squamous Epithelial


Cells 


 Mod /LPF  





 


Urine Bacteria


 


 0 /HPF (0-FEW)





 


Urine Mucus  Mod /LPF  





 Laboratory Tests


9/28/18 10:46








 Laboratory Tests


9/28/18 10:46











EKG


EKG


[EKG: Reviewed]





Radiology/Procedures


Radiology/Procedures


[CT head: NAD]





Course & Med Decision Making


Course & Med Decision Making


Pertinent Labs and Imaging studies reviewed. (See chart for details)





[No focal neurologic symptoms on exam. Patient walks with steady gait. Does 

complain of meclizine vertigo, symptoms improved with Ativan. Will recommend 

meclizine when necessary, and close PCP follow-up. Home safety instructions and 

fall precautions reviewed. Patient and daughter verbalized understanding 

agreement discharge instructions prior to departure.]





Dragon Disclaimer


Dragon Disclaimer


This electronic medical record was generated, in whole or in part, using a 

voice recognition dictation system.





Departure


Departure


Impression:  


 Primary Impression:  


 Dizziness


Disposition:  01 HOME, SELF-CARE


Condition:  GOOD


Referrals:  


NIRAV RUBIN (PCP)


Patient Instructions:  Vertigo, Easy-to-Read





Additional Instructions:  


Please increase fluids and take meclizine as needed for dizziness. Please be 

careful when standing and walking as you are increased risk of fall and injury. 

Return to the ED if new or worsening symptoms.


Scripts


Meclizine Hcl (MECLIZINE HCL) 25 Mg Tablet


1 TAB PO TID, #20 TAB


   Prov: NANCY MODI DO         9/28/18











NANCY MODI DO Sep 28, 2018 11:59

## 2018-09-29 VITALS — DIASTOLIC BLOOD PRESSURE: 62 MMHG | SYSTOLIC BLOOD PRESSURE: 114 MMHG

## 2018-09-29 VITALS — DIASTOLIC BLOOD PRESSURE: 61 MMHG | SYSTOLIC BLOOD PRESSURE: 125 MMHG

## 2018-09-29 VITALS — DIASTOLIC BLOOD PRESSURE: 65 MMHG | SYSTOLIC BLOOD PRESSURE: 143 MMHG

## 2018-09-29 VITALS — DIASTOLIC BLOOD PRESSURE: 77 MMHG | SYSTOLIC BLOOD PRESSURE: 149 MMHG

## 2018-09-29 VITALS — DIASTOLIC BLOOD PRESSURE: 43 MMHG | SYSTOLIC BLOOD PRESSURE: 121 MMHG

## 2018-09-29 VITALS — DIASTOLIC BLOOD PRESSURE: 76 MMHG | SYSTOLIC BLOOD PRESSURE: 112 MMHG

## 2018-09-29 LAB
ALBUMIN SERPL-MCNC: 2.8 G/DL (ref 3.4–5)
ALBUMIN/GLOB SERPL: 0.7 {RATIO} (ref 1–1.7)
ALP SERPL-CCNC: 88 U/L (ref 46–116)
ALT SERPL-CCNC: 21 U/L (ref 14–59)
ANION GAP SERPL CALC-SCNC: 8 MMOL/L (ref 6–14)
AST SERPL-CCNC: 10 U/L (ref 15–37)
BASOPHILS # BLD AUTO: 0.1 X10^3/UL (ref 0–0.2)
BASOPHILS NFR BLD: 1 % (ref 0–3)
BILIRUB SERPL-MCNC: 0.3 MG/DL (ref 0.2–1)
BUN SERPL-MCNC: 13 MG/DL (ref 7–20)
BUN/CREAT SERPL: 26 (ref 6–20)
CALCIUM SERPL-MCNC: 9 MG/DL (ref 8.5–10.1)
CHLORIDE SERPL-SCNC: 107 MMOL/L (ref 98–107)
CO2 SERPL-SCNC: 28 MMOL/L (ref 21–32)
CREAT SERPL-MCNC: 0.5 MG/DL (ref 0.6–1)
EOSINOPHIL NFR BLD: 0.1 X10^3/UL (ref 0–0.7)
EOSINOPHIL NFR BLD: 1 % (ref 0–3)
ERYTHROCYTE [DISTWIDTH] IN BLOOD BY AUTOMATED COUNT: 18.5 % (ref 11.5–14.5)
GFR SERPLBLD BASED ON 1.73 SQ M-ARVRAT: 149.8 ML/MIN
GLOBULIN SER-MCNC: 3.8 G/DL (ref 2.2–3.8)
GLUCOSE SERPL-MCNC: 131 MG/DL (ref 70–99)
HCT VFR BLD CALC: 31.6 % (ref 36–47)
HGB BLD-MCNC: 10.2 G/DL (ref 12–15.5)
LYMPHOCYTES # BLD: 3.6 X10^3/UL (ref 1–4.8)
LYMPHOCYTES NFR BLD AUTO: 49 % (ref 24–48)
MCH RBC QN AUTO: 22 PG (ref 25–35)
MCHC RBC AUTO-ENTMCNC: 32 G/DL (ref 31–37)
MCV RBC AUTO: 67 FL (ref 79–100)
MONO #: 0.6 X10^3/UL (ref 0–1.1)
MONOCYTES NFR BLD: 8 % (ref 0–9)
NEUT #: 3.1 X10^3UL (ref 1.8–7.7)
NEUTROPHILS NFR BLD AUTO: 42 % (ref 31–73)
PLATELET # BLD AUTO: 168 X10^3/UL (ref 140–400)
POTASSIUM SERPL-SCNC: 3.3 MMOL/L (ref 3.5–5.1)
PROT SERPL-MCNC: 6.6 G/DL (ref 6.4–8.2)
RBC # BLD AUTO: 4.74 X10^6/UL (ref 3.5–5.4)
SODIUM SERPL-SCNC: 143 MMOL/L (ref 136–145)
WBC # BLD AUTO: 7.4 X10^3/UL (ref 4–11)

## 2018-09-29 RX ADMIN — MECLIZINE SCH MG: 12.5 TABLET ORAL at 20:05

## 2018-09-29 RX ADMIN — INSULIN LISPRO SCH UNITS: 100 INJECTION, SOLUTION INTRAVENOUS; SUBCUTANEOUS at 12:07

## 2018-09-29 RX ADMIN — MECLIZINE SCH MG: 12.5 TABLET ORAL at 08:42

## 2018-09-29 RX ADMIN — Medication SCH CAP: at 20:05

## 2018-09-29 RX ADMIN — AMOXICILLIN AND CLAVULANATE POTASSIUM SCH TAB: 875; 125 TABLET, FILM COATED ORAL at 14:50

## 2018-09-29 RX ADMIN — ATORVASTATIN CALCIUM SCH MG: 20 TABLET, FILM COATED ORAL at 20:04

## 2018-09-29 RX ADMIN — MECLIZINE SCH MG: 12.5 TABLET ORAL at 04:06

## 2018-09-29 RX ADMIN — MECLIZINE SCH MG: 12.5 TABLET ORAL at 16:17

## 2018-09-29 RX ADMIN — BACITRACIN SCH MLS/HR: 5000 INJECTION, POWDER, FOR SOLUTION INTRAMUSCULAR at 05:43

## 2018-09-29 RX ADMIN — BACITRACIN SCH MLS/HR: 5000 INJECTION, POWDER, FOR SOLUTION INTRAMUSCULAR at 12:10

## 2018-09-29 RX ADMIN — LOSARTAN POTASSIUM SCH MG: 50 TABLET ORAL at 08:43

## 2018-09-29 RX ADMIN — INSULIN LISPRO SCH UNITS: 100 INJECTION, SOLUTION INTRAVENOUS; SUBCUTANEOUS at 17:00

## 2018-09-29 RX ADMIN — INSULIN LISPRO SCH UNITS: 100 INJECTION, SOLUTION INTRAVENOUS; SUBCUTANEOUS at 08:00

## 2018-09-29 RX ADMIN — AMOXICILLIN AND CLAVULANATE POTASSIUM SCH TAB: 875; 125 TABLET, FILM COATED ORAL at 20:05

## 2018-09-29 RX ADMIN — MECLIZINE SCH MG: 12.5 TABLET ORAL at 00:14

## 2018-09-29 RX ADMIN — MECLIZINE SCH MG: 12.5 TABLET ORAL at 12:04

## 2018-09-29 NOTE — PDOC
PROGRESS NOTES


Chief Complaint


Chief Complaint


presented to ER with  Vertigo, suspect vestibulitis with the inner ear 

disturbance.  





feels better today but still dizzy and nausea with sudden movement





History of Present Illness


History of Present Illness


1-vertigo vestibulitis


2-sinus retention cyst and mild sinusitis started Augmentin


3-HTN


4-DM








improving , await neurology input expect home tomorrow





Vitals


Vitals





Vital Signs








  Date Time  Temp Pulse Resp B/P (MAP) Pulse Ox O2 Delivery O2 Flow Rate FiO2


 


9/29/18 08:43  83  143/65    


 


9/29/18 07:30      Room Air  


 


9/29/18 07:28 98.0  20  100   





 98.0       











Physical Exam


General:  Alert, Oriented X3, Cooperative


Heart:  Regular rate, Normal S1, Normal S2


Lungs:  Clear


Abdomen:  Normal bowel sounds, Soft


Extremities:  No clubbing, No cyanosis


Skin:  No significant lesion





Labs


LABS





Laboratory Tests








Test


 9/28/18


12:22 9/28/18


17:22 9/28/18


20:50 9/29/18


03:40


 


Urine Collection Type Unknown    


 


Urine Color Yellow    


 


Urine Clarity Clear    


 


Urine pH 6.0    


 


Urine Specific Gravity 1.025    


 


Urine Protein


 Negative mg/dL


(NEG-TRACE) 


 


 





 


Urine Glucose (UA)


 Negative mg/dL


(NEG) 


 


 





 


Urine Ketones (Stick)


 Negative mg/dL


(NEG) 


 


 





 


Urine Blood Negative (NEG)    


 


Urine Nitrite Negative (NEG)    


 


Urine Bilirubin Negative (NEG)    


 


Urine Urobilinogen Dipstick


 1.0 mg/dL (0.2


mg/dL) 


 


 





 


Urine Leukocyte Esterase Negative (NEG)    


 


Urine RBC 0 /HPF (0-2)    


 


Urine WBC 1-4 /HPF (0-4)    


 


Urine Squamous Epithelial


Cells Mod /LPF 


 


 


 





 


Urine Bacteria 0 /HPF (0-FEW)    


 


Urine Mucus Mod /LPF    


 


Glucose (Fingerstick)


 


 192 mg/dL


(70-99) 225 mg/dL


(70-99) 





 


White Blood Count


 


 


 


 7.4 x10^3/uL


(4.0-11.0)


 


Red Blood Count


 


 


 


 4.74 x10^6/uL


(3.50-5.40)


 


Hemoglobin


 


 


 


 10.2 g/dL


(12.0-15.5)


 


Hematocrit


 


 


 


 31.6 %


(36.0-47.0)


 


Mean Corpuscular Volume    67 fL () 


 


Mean Corpuscular Hemoglobin    22 pg (25-35) 


 


Mean Corpuscular Hemoglobin


Concent 


 


 


 32 g/dL


(31-37)


 


Red Cell Distribution Width


 


 


 


 18.5 %


(11.5-14.5)


 


Platelet Count


 


 


 


 168 x10^3/uL


(140-400)


 


Neutrophils (%) (Auto)    42 % (31-73) 


 


Lymphocytes (%) (Auto)    49 % (24-48) 


 


Monocytes (%) (Auto)    8 % (0-9) 


 


Eosinophils (%) (Auto)    1 % (0-3) 


 


Basophils (%) (Auto)    1 % (0-3) 


 


Neutrophils # (Auto)


 


 


 


 3.1 x10^3uL


(1.8-7.7)


 


Lymphocytes # (Auto)


 


 


 


 3.6 x10^3/uL


(1.0-4.8)


 


Monocytes # (Auto)


 


 


 


 0.6 x10^3/uL


(0.0-1.1)


 


Eosinophils # (Auto)


 


 


 


 0.1 x10^3/uL


(0.0-0.7)


 


Basophils # (Auto)


 


 


 


 0.1 x10^3/uL


(0.0-0.2)


 


Sodium Level


 


 


 


 143 mmol/L


(136-145)


 


Potassium Level


 


 


 


 3.3 mmol/L


(3.5-5.1)


 


Chloride Level


 


 


 


 107 mmol/L


()


 


Carbon Dioxide Level


 


 


 


 28 mmol/L


(21-32)


 


Anion Gap    8 (6-14) 


 


Blood Urea Nitrogen


 


 


 


 13 mg/dL


(7-20)


 


Creatinine


 


 


 


 0.5 mg/dL


(0.6-1.0)


 


Estimated GFR


(Cockcroft-Gault) 


 


 


 149.8 





 


BUN/Creatinine Ratio    26 (6-20) 


 


Glucose Level


 


 


 


 131 mg/dL


(70-99)


 


Calcium Level


 


 


 


 9.0 mg/dL


(8.5-10.1)


 


Total Bilirubin


 


 


 


 0.3 mg/dL


(0.2-1.0)


 


Aspartate Amino Transf


(AST/SGOT) 


 


 


 10 U/L (15-37) 





 


Alanine Aminotransferase


(ALT/SGPT) 


 


 


 21 U/L (14-59) 





 


Alkaline Phosphatase


 


 


 


 88 U/L


()


 


Total Protein


 


 


 


 6.6 g/dL


(6.4-8.2)


 


Albumin


 


 


 


 2.8 g/dL


(3.4-5.0)


 


Albumin/Globulin Ratio    0.7 (1.0-1.7) 











Assessment and Plan


Assessmemt and Plan


Problems


Medical Problems:


(1) Dizziness


Status: Acute  











Comment


Review of Relevant


I have reviewed the following items blanca (where applicable) has been applied.


Labs





Laboratory Tests








Test


 9/28/18


10:46 9/28/18


12:22 9/28/18


17:22 9/28/18


20:50


 


White Blood Count


 5.8 x10^3/uL


(4.0-11.0) 


 


 





 


Red Blood Count


 4.95 x10^6/uL


(3.50-5.40) 


 


 





 


Hemoglobin


 10.7 g/dL


(12.0-15.5) 


 


 





 


Hematocrit


 32.6 %


(36.0-47.0) 


 


 





 


Mean Corpuscular Volume 66 fL ()    


 


Mean Corpuscular Hemoglobin 22 pg (25-35)    


 


Mean Corpuscular Hemoglobin


Concent 33 g/dL


(31-37) 


 


 





 


Red Cell Distribution Width


 18.8 %


(11.5-14.5) 


 


 





 


Platelet Count


 214 x10^3/uL


(140-400) 


 


 





 


Neutrophils (%) (Auto) 52 % (31-73)    


 


Lymphocytes (%) (Auto) 40 % (24-48)    


 


Monocytes (%) (Auto) 6 % (0-9)    


 


Eosinophils (%) (Auto) 1 % (0-3)    


 


Basophils (%) (Auto) 2 % (0-3)    


 


Neutrophils # (Auto)


 3.0 x10^3uL


(1.8-7.7) 


 


 





 


Lymphocytes # (Auto)


 2.3 x10^3/uL


(1.0-4.8) 


 


 





 


Monocytes # (Auto)


 0.3 x10^3/uL


(0.0-1.1) 


 


 





 


Eosinophils # (Auto)


 0.0 x10^3/uL


(0.0-0.7) 


 


 





 


Basophils # (Auto)


 0.1 x10^3/uL


(0.0-0.2) 


 


 





 


Platelet Estimate


 Adequate


(ADEQUATE) 


 


 





 


Hypochromasia Mod    


 


Anisocytosis Slight    


 


Microcytosis Slight    


 


Target Cells Few    


 


Sodium Level


 143 mmol/L


(136-145) 


 


 





 


Potassium Level


 3.5 mmol/L


(3.5-5.1) 


 


 





 


Chloride Level


 105 mmol/L


() 


 


 





 


Carbon Dioxide Level


 30 mmol/L


(21-32) 


 


 





 


Anion Gap 8 (6-14)    


 


Blood Urea Nitrogen


 12 mg/dL


(7-20) 


 


 





 


Creatinine


 0.5 mg/dL


(0.6-1.0) 


 


 





 


Estimated GFR


(Cockcroft-Gault) 149.8 


 


 


 





 


BUN/Creatinine Ratio 24 (6-20)    


 


Glucose Level


 162 mg/dL


(70-99) 


 


 





 


Calcium Level


 9.4 mg/dL


(8.5-10.1) 


 


 





 


Total Bilirubin


 0.4 mg/dL


(0.2-1.0) 


 


 





 


Aspartate Amino Transf


(AST/SGOT) 11 U/L (15-37) 


 


 


 





 


Alanine Aminotransferase


(ALT/SGPT) 22 U/L (14-59) 


 


 


 





 


Alkaline Phosphatase


 93 U/L


() 


 


 





 


Troponin I Quantitative


 < 0.017 ng/mL


(0.000-0.055) 


 


 





 


Total Protein


 7.2 g/dL


(6.4-8.2) 


 


 





 


Albumin


 3.1 g/dL


(3.4-5.0) 


 


 





 


Albumin/Globulin Ratio 0.8 (1.0-1.7)    


 


Thyroid Stimulating Hormone


(TSH) 0.024 uIU/mL


(0.358-3.74) 


 


 





 


Urine Collection Type  Unknown   


 


Urine Color  Yellow   


 


Urine Clarity  Clear   


 


Urine pH  6.0   


 


Urine Specific Gravity  1.025   


 


Urine Protein


 


 Negative mg/dL


(NEG-TRACE) 


 





 


Urine Glucose (UA)


 


 Negative mg/dL


(NEG) 


 





 


Urine Ketones (Stick)


 


 Negative mg/dL


(NEG) 


 





 


Urine Blood  Negative (NEG)   


 


Urine Nitrite  Negative (NEG)   


 


Urine Bilirubin  Negative (NEG)   


 


Urine Urobilinogen Dipstick


 


 1.0 mg/dL (0.2


mg/dL) 


 





 


Urine Leukocyte Esterase  Negative (NEG)   


 


Urine RBC  0 /HPF (0-2)   


 


Urine WBC  1-4 /HPF (0-4)   


 


Urine Squamous Epithelial


Cells 


 Mod /LPF 


 


 





 


Urine Bacteria  0 /HPF (0-FEW)   


 


Urine Mucus  Mod /LPF   


 


Glucose (Fingerstick)


 


 


 192 mg/dL


(70-99) 225 mg/dL


(70-99)


 


Test


 9/29/18


03:40 


 


 





 


White Blood Count


 7.4 x10^3/uL


(4.0-11.0) 


 


 





 


Red Blood Count


 4.74 x10^6/uL


(3.50-5.40) 


 


 





 


Hemoglobin


 10.2 g/dL


(12.0-15.5) 


 


 





 


Hematocrit


 31.6 %


(36.0-47.0) 


 


 





 


Mean Corpuscular Volume 67 fL ()    


 


Mean Corpuscular Hemoglobin 22 pg (25-35)    


 


Mean Corpuscular Hemoglobin


Concent 32 g/dL


(31-37) 


 


 





 


Red Cell Distribution Width


 18.5 %


(11.5-14.5) 


 


 





 


Platelet Count


 168 x10^3/uL


(140-400) 


 


 





 


Neutrophils (%) (Auto) 42 % (31-73)    


 


Lymphocytes (%) (Auto) 49 % (24-48)    


 


Monocytes (%) (Auto) 8 % (0-9)    


 


Eosinophils (%) (Auto) 1 % (0-3)    


 


Basophils (%) (Auto) 1 % (0-3)    


 


Neutrophils # (Auto)


 3.1 x10^3uL


(1.8-7.7) 


 


 





 


Lymphocytes # (Auto)


 3.6 x10^3/uL


(1.0-4.8) 


 


 





 


Monocytes # (Auto)


 0.6 x10^3/uL


(0.0-1.1) 


 


 





 


Eosinophils # (Auto)


 0.1 x10^3/uL


(0.0-0.7) 


 


 





 


Basophils # (Auto)


 0.1 x10^3/uL


(0.0-0.2) 


 


 





 


Sodium Level


 143 mmol/L


(136-145) 


 


 





 


Potassium Level


 3.3 mmol/L


(3.5-5.1) 


 


 





 


Chloride Level


 107 mmol/L


() 


 


 





 


Carbon Dioxide Level


 28 mmol/L


(21-32) 


 


 





 


Anion Gap 8 (6-14)    


 


Blood Urea Nitrogen


 13 mg/dL


(7-20) 


 


 





 


Creatinine


 0.5 mg/dL


(0.6-1.0) 


 


 





 


Estimated GFR


(Cockcroft-Gault) 149.8 


 


 


 





 


BUN/Creatinine Ratio 26 (6-20)    


 


Glucose Level


 131 mg/dL


(70-99) 


 


 





 


Calcium Level


 9.0 mg/dL


(8.5-10.1) 


 


 





 


Total Bilirubin


 0.3 mg/dL


(0.2-1.0) 


 


 





 


Aspartate Amino Transf


(AST/SGOT) 10 U/L (15-37) 


 


 


 





 


Alanine Aminotransferase


(ALT/SGPT) 21 U/L (14-59) 


 


 


 





 


Alkaline Phosphatase


 88 U/L


() 


 


 





 


Total Protein


 6.6 g/dL


(6.4-8.2) 


 


 





 


Albumin


 2.8 g/dL


(3.4-5.0) 


 


 





 


Albumin/Globulin Ratio 0.7 (1.0-1.7)    








Laboratory Tests








Test


 9/28/18


12:22 9/28/18


17:22 9/28/18


20:50 9/29/18


03:40


 


Urine Collection Type Unknown    


 


Urine Color Yellow    


 


Urine Clarity Clear    


 


Urine pH 6.0    


 


Urine Specific Gravity 1.025    


 


Urine Protein


 Negative mg/dL


(NEG-TRACE) 


 


 





 


Urine Glucose (UA)


 Negative mg/dL


(NEG) 


 


 





 


Urine Ketones (Stick)


 Negative mg/dL


(NEG) 


 


 





 


Urine Blood Negative (NEG)    


 


Urine Nitrite Negative (NEG)    


 


Urine Bilirubin Negative (NEG)    


 


Urine Urobilinogen Dipstick


 1.0 mg/dL (0.2


mg/dL) 


 


 





 


Urine Leukocyte Esterase Negative (NEG)    


 


Urine RBC 0 /HPF (0-2)    


 


Urine WBC 1-4 /HPF (0-4)    


 


Urine Squamous Epithelial


Cells Mod /LPF 


 


 


 





 


Urine Bacteria 0 /HPF (0-FEW)    


 


Urine Mucus Mod /LPF    


 


Glucose (Fingerstick)


 


 192 mg/dL


(70-99) 225 mg/dL


(70-99) 





 


White Blood Count


 


 


 


 7.4 x10^3/uL


(4.0-11.0)


 


Red Blood Count


 


 


 


 4.74 x10^6/uL


(3.50-5.40)


 


Hemoglobin


 


 


 


 10.2 g/dL


(12.0-15.5)


 


Hematocrit


 


 


 


 31.6 %


(36.0-47.0)


 


Mean Corpuscular Volume    67 fL () 


 


Mean Corpuscular Hemoglobin    22 pg (25-35) 


 


Mean Corpuscular Hemoglobin


Concent 


 


 


 32 g/dL


(31-37)


 


Red Cell Distribution Width


 


 


 


 18.5 %


(11.5-14.5)


 


Platelet Count


 


 


 


 168 x10^3/uL


(140-400)


 


Neutrophils (%) (Auto)    42 % (31-73) 


 


Lymphocytes (%) (Auto)    49 % (24-48) 


 


Monocytes (%) (Auto)    8 % (0-9) 


 


Eosinophils (%) (Auto)    1 % (0-3) 


 


Basophils (%) (Auto)    1 % (0-3) 


 


Neutrophils # (Auto)


 


 


 


 3.1 x10^3uL


(1.8-7.7)


 


Lymphocytes # (Auto)


 


 


 


 3.6 x10^3/uL


(1.0-4.8)


 


Monocytes # (Auto)


 


 


 


 0.6 x10^3/uL


(0.0-1.1)


 


Eosinophils # (Auto)


 


 


 


 0.1 x10^3/uL


(0.0-0.7)


 


Basophils # (Auto)


 


 


 


 0.1 x10^3/uL


(0.0-0.2)


 


Sodium Level


 


 


 


 143 mmol/L


(136-145)


 


Potassium Level


 


 


 


 3.3 mmol/L


(3.5-5.1)


 


Chloride Level


 


 


 


 107 mmol/L


()


 


Carbon Dioxide Level


 


 


 


 28 mmol/L


(21-32)


 


Anion Gap    8 (6-14) 


 


Blood Urea Nitrogen


 


 


 


 13 mg/dL


(7-20)


 


Creatinine


 


 


 


 0.5 mg/dL


(0.6-1.0)


 


Estimated GFR


(Cockcroft-Gault) 


 


 


 149.8 





 


BUN/Creatinine Ratio    26 (6-20) 


 


Glucose Level


 


 


 


 131 mg/dL


(70-99)


 


Calcium Level


 


 


 


 9.0 mg/dL


(8.5-10.1)


 


Total Bilirubin


 


 


 


 0.3 mg/dL


(0.2-1.0)


 


Aspartate Amino Transf


(AST/SGOT) 


 


 


 10 U/L (15-37) 





 


Alanine Aminotransferase


(ALT/SGPT) 


 


 


 21 U/L (14-59) 





 


Alkaline Phosphatase


 


 


 


 88 U/L


()


 


Total Protein


 


 


 


 6.6 g/dL


(6.4-8.2)


 


Albumin


 


 


 


 2.8 g/dL


(3.4-5.0)


 


Albumin/Globulin Ratio    0.7 (1.0-1.7) 








Medications





Current Medications


Lorazepam (Ativan) 5 mg 1X  ONCE IV ;  Start 9/28/18 at 11:30;  Stop 9/28/18 at 

12:47;  Status DC


Lorazepam (Ativan) 1 mg 1X  ONCE IM  Last administered on 9/28/18at 12:53;  

Start 9/28/18 at 13:00;  Stop 9/28/18 at 13:01;  Status DC


Ondansetron HCl (Zofran) 4 mg PRN Q8HRS  PRN IV NAUSEA/VOMITING;  Start 9/28/18 

at 15:45;  Stop 9/29/18 at 15:44


Sodium Chloride 1,000 ml @  150 mls/hr Q6H40M IV  Last administered on 9/29/ 18at 05:43;  Start 9/28/18 at 15:36;  Stop 9/29/18 at 15:35


Meclizine HCl (Antivert) 25 mg Q4HRS PO  Last administered on 9/29/18at 08:42;  

Start 9/28/18 at 16:00


Amlodipine Besylate (Norvasc) 5 mg DAILY PO  Last administered on 9/29/18at 08:

43;  Start 9/29/18 at 09:00


Atorvastatin Calcium (Lipitor) 40 mg HS PO  Last administered on 9/28/18at 22:47

;  Start 9/28/18 at 23:00


Clonazepam (KlonoPIN) 0.5 mg PRN BID  PRN PO ANXIETY / AGITATION Last 

administered on 9/28/18at 22:47;  Start 9/28/18 at 22:15


Hydrochlorothiazide (Microzide) 12.5 mg DAILY PO  Last administered on 9/29/ 18at 08:43;  Start 9/29/18 at 09:00


Insulin Human Lispro (HumaLOG) 0-5 UNITS TIDWMEALS SQ ;  Start 9/29/18 at 08:00


Dextrose (Dextrose 50%-Water Syringe) 12.5 gm PRN Q15MIN  PRN IV SEE COMMENTS;  

Start 9/28/18 at 22:15


Losartan Potassium (Cozaar) 100 mg DAILY PO  Last administered on 9/29/18at 08:

43;  Start 9/29/18 at 09:00





Active Scripts


Active


Benadryl (Diphenhydramine Hcl) 25 Mg Capsule 1 Cap PO QHS


Reported


Amlodipine Besylate 5 Mg Tablet 5 Mg PO DAILY


Klonopin (Clonazepam) 0.5 Mg Tablet 0.5 Mg PO PRN BID


Atorvastatin Calcium 20 Mg Tablet 20 Mg PO HS


Valsartan-Hctz 160-12.5 Mg Tab (Valsartan/Hydrochlorothiazide) 1 Each Tablet 1 

Each PO DAILY


Promethazine Hcl 25 Mg Supp.rect 25 Mg RC Q6H PRN


Metformin Hcl 500 Mg Tablet 1 Tab PO BID


Vitals/I & O





Vital Sign - Last 24 Hours








 9/28/18 9/28/18 9/28/18 9/28/18





 11:15 12:23 12:53 13:32


 


Temp 97.7   





 97.7   


 


Pulse 67 70 74 64


 


Resp 22 16 18 16


 


B/P (MAP) 167/77 (107)   


 


Pulse Ox 99 100 100 99


 


O2 Delivery Room Air   


 


    





    





 9/28/18 9/28/18 9/28/18 9/28/18





 14:30 15:00 15:30 16:20


 


Pulse 80 80 66 


 


Resp 18 20 16 


 


Pulse Ox 98 98 100 


 


O2 Delivery    Room Air





 9/28/18 9/28/18 9/28/18 9/28/18





 16:48 19:46 20:00 23:00


 


Temp 97.7 98.2  98.2





 97.7 98.2  98.2


 


Pulse 68 87  87


 


Resp 16 16  20


 


B/P (MAP) 134/67 (89) 140/75 (96)  127/59 (81)


 


Pulse Ox 99 98  98


 


O2 Delivery Room Air Room Air Room Air Room Air


 


    





    





 9/29/18 9/29/18 9/29/18 9/29/18





 03:18 07:28 07:30 08:43


 


Temp 97.9 98.0  





 97.9 98.0  


 


Pulse 75 83  83


 


Resp 16 20  


 


B/P (MAP) 114/62 (79) 143/65 (91)  143/65


 


Pulse Ox 99 100  


 


O2 Delivery Room Air Room Air Room Air 


 


    





    





 9/29/18   





 08:43   


 


Pulse 83   


 


B/P (MAP) 143/65   














Intake and Output   


 


 9/28/18 9/28/18 9/29/18





 15:00 23:00 07:00


 


Intake Total  240 ml 


 


Balance  240 ml 

















BRITTANY BREWER MD Sep 29, 2018 11:02

## 2018-09-30 VITALS — SYSTOLIC BLOOD PRESSURE: 104 MMHG | DIASTOLIC BLOOD PRESSURE: 59 MMHG

## 2018-09-30 VITALS — SYSTOLIC BLOOD PRESSURE: 133 MMHG | DIASTOLIC BLOOD PRESSURE: 79 MMHG

## 2018-09-30 VITALS — SYSTOLIC BLOOD PRESSURE: 113 MMHG | DIASTOLIC BLOOD PRESSURE: 59 MMHG

## 2018-09-30 VITALS — SYSTOLIC BLOOD PRESSURE: 150 MMHG | DIASTOLIC BLOOD PRESSURE: 48 MMHG

## 2018-09-30 RX ADMIN — MECLIZINE SCH MG: 12.5 TABLET ORAL at 08:51

## 2018-09-30 RX ADMIN — MECLIZINE SCH MG: 12.5 TABLET ORAL at 00:52

## 2018-09-30 RX ADMIN — INSULIN LISPRO SCH UNITS: 100 INJECTION, SOLUTION INTRAVENOUS; SUBCUTANEOUS at 08:00

## 2018-09-30 RX ADMIN — MECLIZINE SCH MG: 12.5 TABLET ORAL at 13:00

## 2018-09-30 RX ADMIN — MECLIZINE SCH MG: 12.5 TABLET ORAL at 04:00

## 2018-09-30 RX ADMIN — LOSARTAN POTASSIUM SCH MG: 50 TABLET ORAL at 08:51

## 2018-09-30 RX ADMIN — Medication SCH CAP: at 08:51

## 2018-09-30 RX ADMIN — MECLIZINE SCH MG: 12.5 TABLET ORAL at 17:07

## 2018-09-30 RX ADMIN — AMOXICILLIN AND CLAVULANATE POTASSIUM SCH TAB: 875; 125 TABLET, FILM COATED ORAL at 08:51

## 2018-09-30 RX ADMIN — INSULIN LISPRO SCH UNITS: 100 INJECTION, SOLUTION INTRAVENOUS; SUBCUTANEOUS at 12:00

## 2018-09-30 RX ADMIN — INSULIN LISPRO SCH UNITS: 100 INJECTION, SOLUTION INTRAVENOUS; SUBCUTANEOUS at 17:00

## 2018-09-30 NOTE — PDOC2
CONSULT


Date of Consult


Date of Consult


DATE: 9/30/18 


TIME: 09:56





Reason for Consult


Reason for Consult:


Dizziness





History of Present Illness


Reason for Visit:


This patient is 53-year-old woman who presented to emergency room with 

complaint of dizziness patient reports she previously have a history of 

dizziness she will have more symptoms when she is trying to get out of the bed 

quickly or trying to change in head position. Her symptoms were improving. She 

reports over the last few days she is having more symptoms. When she is trying 

to get out of the bed too quickly she will feel lightheaded, dizziness. Patient 

denies any difficulty speaking tingling numbness on the face or focal extremity 

weakness. Her symptoms are improving.





Current Problem List


Problem List


Problems


Medical Problems:


(1) Dizziness


Status: Acute  











Current Medications


Current Medications





Current Medications


Lorazepam (Ativan) 5 mg 1X  ONCE IV ;  Start 9/28/18 at 11:30;  Stop 9/28/18 at 

12:47;  Status DC


Lorazepam (Ativan) 1 mg 1X  ONCE IM  Last administered on 9/28/18at 12:53;  

Start 9/28/18 at 13:00;  Stop 9/28/18 at 13:01;  Status DC


Ondansetron HCl (Zofran) 4 mg PRN Q8HRS  PRN IV NAUSEA/VOMITING;  Start 9/28/18 

at 15:45;  Stop 9/29/18 at 15:44;  Status DC


Sodium Chloride 1,000 ml @  150 mls/hr Q6H40M IV  Last administered on 9/29/ 18at 12:10;  Start 9/28/18 at 15:36;  Stop 9/29/18 at 15:35;  Status DC


Meclizine HCl (Antivert) 25 mg Q4HRS PO  Last administered on 9/30/18at 08:51;  

Start 9/28/18 at 16:00


Amlodipine Besylate (Norvasc) 5 mg DAILY PO  Last administered on 9/30/18at 08:

51;  Start 9/29/18 at 09:00


Atorvastatin Calcium (Lipitor) 40 mg HS PO  Last administered on 9/29/18at 20:04

;  Start 9/28/18 at 23:00


Clonazepam (KlonoPIN) 0.5 mg PRN BID  PRN PO ANXIETY / AGITATION Last 

administered on 9/28/18at 22:47;  Start 9/28/18 at 22:15


Hydrochlorothiazide (Microzide) 12.5 mg DAILY PO  Last administered on 9/30/ 18at 08:50;  Start 9/29/18 at 09:00


Insulin Human Lispro (HumaLOG) 0-5 UNITS TIDWMEALS SQ  Last administered on 9/29 /18at 12:07;  Start 9/29/18 at 08:00


Dextrose (Dextrose 50%-Water Syringe) 12.5 gm PRN Q15MIN  PRN IV SEE COMMENTS;  

Start 9/28/18 at 22:15


Losartan Potassium (Cozaar) 100 mg DAILY PO  Last administered on 9/30/18at 08:

51;  Start 9/29/18 at 09:00


Potassium Chloride (Klor-Con) 40 meq 1X  ONCE PO  Last administered on 9/29/ 18at 14:51;  Start 9/29/18 at 13:30;  Stop 9/29/18 at 13:31;  Status DC


Amoxicillin/ Clavulanate Potassium (Augmentin 875/ 125mg) 1 tab BID PO  Last 

administered on 9/30/18at 08:51;  Start 9/29/18 at 13:30


Lactobacillus Rhamnosus (Culturelle) 1 cap BID PO  Last administered on 9/30/ 18at 08:51;  Start 9/29/18 at 21:00





Active Scripts


Active


Benadryl (Diphenhydramine Hcl) 25 Mg Capsule 1 Cap PO QHS


Reported


Amlodipine Besylate 5 Mg Tablet 5 Mg PO DAILY


Klonopin (Clonazepam) 0.5 Mg Tablet 0.5 Mg PO PRN BID


Atorvastatin Calcium 20 Mg Tablet 20 Mg PO HS


Valsartan-Hctz 160-12.5 Mg Tab (Valsartan/Hydrochlorothiazide) 1 Each Tablet 1 

Each PO DAILY


Promethazine Hcl 25 Mg Supp.rect 25 Mg RC Q6H PRN


Metformin Hcl 500 Mg Tablet 1 Tab PO BID





Allergies


Allergies:  


Coded Allergies:  


     codeine (Unverified  Adverse Reaction, Intermediate, VOMITING, 6/9/15)





Physical Exam


Physical Exam


REVIEW OF SYSTEMS:





Otherwise, not pertinant10-point review of systems.





PHYSICAL EXAMINATION:   


General appearance is in acute distress.  


HEENT:  Normocephalic and nontraumatic.  Eyes, nose, ears, and throat are 

unremarkable.  


Neck is supple. No lymphadenopathy.   No crepitus. 


Cardiovascular:  S1, S2, regular rate and rhythm.  


Pulmonary:  Clear to auscultation bilaterally. 


Abdomen:  Bowel sounds are positive.  Abdomen is soft, nontender, and 

nondistended.    








NEUROLOGICAL  EXAMINATION:


Alert 


Oriented to time, place and person.


PERRL.


EOMI.


CN: no focal findings.


Muscle tone: within normal.


Muscle strength:able to move all exts equally.


DTR: 1-2


Plantar reflex: Flexor response bilaterally 


Gait: not examined in bed. 


Sensory exam: no abnormal findings.


No obvious cerebellar signs elicited.





Vitals


VITALS





Vital Signs








  Date Time  Temp Pulse Resp B/P (MAP) Pulse Ox O2 Delivery O2 Flow Rate FiO2


 


9/30/18 08:51  68  133/79    


 


9/30/18 07:58 98.2  20  100 Room Air  





 98.2       











Labs


Labs





Laboratory Tests








Test


 9/28/18


10:46 9/28/18


12:22 9/28/18


17:22 9/28/18


20:50


 


White Blood Count


 5.8 x10^3/uL


(4.0-11.0) 


 


 





 


Red Blood Count


 4.95 x10^6/uL


(3.50-5.40) 


 


 





 


Hemoglobin


 10.7 g/dL


(12.0-15.5) 


 


 





 


Hematocrit


 32.6 %


(36.0-47.0) 


 


 





 


Mean Corpuscular Volume 66 fL ()    


 


Mean Corpuscular Hemoglobin 22 pg (25-35)    


 


Mean Corpuscular Hemoglobin


Concent 33 g/dL


(31-37) 


 


 





 


Red Cell Distribution Width


 18.8 %


(11.5-14.5) 


 


 





 


Platelet Count


 214 x10^3/uL


(140-400) 


 


 





 


Neutrophils (%) (Auto) 52 % (31-73)    


 


Lymphocytes (%) (Auto) 40 % (24-48)    


 


Monocytes (%) (Auto) 6 % (0-9)    


 


Eosinophils (%) (Auto) 1 % (0-3)    


 


Basophils (%) (Auto) 2 % (0-3)    


 


Neutrophils # (Auto)


 3.0 x10^3uL


(1.8-7.7) 


 


 





 


Lymphocytes # (Auto)


 2.3 x10^3/uL


(1.0-4.8) 


 


 





 


Monocytes # (Auto)


 0.3 x10^3/uL


(0.0-1.1) 


 


 





 


Eosinophils # (Auto)


 0.0 x10^3/uL


(0.0-0.7) 


 


 





 


Basophils # (Auto)


 0.1 x10^3/uL


(0.0-0.2) 


 


 





 


Platelet Estimate


 Adequate


(ADEQUATE) 


 


 





 


Hypochromasia Mod    


 


Anisocytosis Slight    


 


Microcytosis Slight    


 


Target Cells Few    


 


Sodium Level


 143 mmol/L


(136-145) 


 


 





 


Potassium Level


 3.5 mmol/L


(3.5-5.1) 


 


 





 


Chloride Level


 105 mmol/L


() 


 


 





 


Carbon Dioxide Level


 30 mmol/L


(21-32) 


 


 





 


Anion Gap 8 (6-14)    


 


Blood Urea Nitrogen


 12 mg/dL


(7-20) 


 


 





 


Creatinine


 0.5 mg/dL


(0.6-1.0) 


 


 





 


Estimated GFR


(Cockcroft-Gault) 149.8 


 


 


 





 


BUN/Creatinine Ratio 24 (6-20)    


 


Glucose Level


 162 mg/dL


(70-99) 


 


 





 


Calcium Level


 9.4 mg/dL


(8.5-10.1) 


 


 





 


Total Bilirubin


 0.4 mg/dL


(0.2-1.0) 


 


 





 


Aspartate Amino Transf


(AST/SGOT) 11 U/L (15-37) 


 


 


 





 


Alanine Aminotransferase


(ALT/SGPT) 22 U/L (14-59) 


 


 


 





 


Alkaline Phosphatase


 93 U/L


() 


 


 





 


Troponin I Quantitative


 < 0.017 ng/mL


(0.000-0.055) 


 


 





 


Total Protein


 7.2 g/dL


(6.4-8.2) 


 


 





 


Albumin


 3.1 g/dL


(3.4-5.0) 


 


 





 


Albumin/Globulin Ratio 0.8 (1.0-1.7)    


 


Thyroid Stimulating Hormone


(TSH) 0.024 uIU/mL


(0.358-3.74) 


 


 





 


Urine Collection Type  Unknown   


 


Urine Color  Yellow   


 


Urine Clarity  Clear   


 


Urine pH  6.0   


 


Urine Specific Gravity  1.025   


 


Urine Protein


 


 Negative mg/dL


(NEG-TRACE) 


 





 


Urine Glucose (UA)


 


 Negative mg/dL


(NEG) 


 





 


Urine Ketones (Stick)


 


 Negative mg/dL


(NEG) 


 





 


Urine Blood  Negative (NEG)   


 


Urine Nitrite  Negative (NEG)   


 


Urine Bilirubin  Negative (NEG)   


 


Urine Urobilinogen Dipstick


 


 1.0 mg/dL (0.2


mg/dL) 


 





 


Urine Leukocyte Esterase  Negative (NEG)   


 


Urine RBC  0 /HPF (0-2)   


 


Urine WBC  1-4 /HPF (0-4)   


 


Urine Squamous Epithelial


Cells 


 Mod /LPF 


 


 





 


Urine Bacteria  0 /HPF (0-FEW)   


 


Urine Mucus  Mod /LPF   


 


Glucose (Fingerstick)


 


 


 192 mg/dL


(70-99) 225 mg/dL


(70-99)


 


Test


 9/29/18


03:40 9/29/18


11:22 9/29/18


16:59 9/29/18


20:54


 


White Blood Count


 7.4 x10^3/uL


(4.0-11.0) 


 


 





 


Red Blood Count


 4.74 x10^6/uL


(3.50-5.40) 


 


 





 


Hemoglobin


 10.2 g/dL


(12.0-15.5) 


 


 





 


Hematocrit


 31.6 %


(36.0-47.0) 


 


 





 


Mean Corpuscular Volume 67 fL ()    


 


Mean Corpuscular Hemoglobin 22 pg (25-35)    


 


Mean Corpuscular Hemoglobin


Concent 32 g/dL


(31-37) 


 


 





 


Red Cell Distribution Width


 18.5 %


(11.5-14.5) 


 


 





 


Platelet Count


 168 x10^3/uL


(140-400) 


 


 





 


Neutrophils (%) (Auto) 42 % (31-73)    


 


Lymphocytes (%) (Auto) 49 % (24-48)    


 


Monocytes (%) (Auto) 8 % (0-9)    


 


Eosinophils (%) (Auto) 1 % (0-3)    


 


Basophils (%) (Auto) 1 % (0-3)    


 


Neutrophils # (Auto)


 3.1 x10^3uL


(1.8-7.7) 


 


 





 


Lymphocytes # (Auto)


 3.6 x10^3/uL


(1.0-4.8) 


 


 





 


Monocytes # (Auto)


 0.6 x10^3/uL


(0.0-1.1) 


 


 





 


Eosinophils # (Auto)


 0.1 x10^3/uL


(0.0-0.7) 


 


 





 


Basophils # (Auto)


 0.1 x10^3/uL


(0.0-0.2) 


 


 





 


Sodium Level


 143 mmol/L


(136-145) 


 


 





 


Potassium Level


 3.3 mmol/L


(3.5-5.1) 


 


 





 


Chloride Level


 107 mmol/L


() 


 


 





 


Carbon Dioxide Level


 28 mmol/L


(21-32) 


 


 





 


Anion Gap 8 (6-14)    


 


Blood Urea Nitrogen


 13 mg/dL


(7-20) 


 


 





 


Creatinine


 0.5 mg/dL


(0.6-1.0) 


 


 





 


Estimated GFR


(Cockcroft-Gault) 149.8 


 


 


 





 


BUN/Creatinine Ratio 26 (6-20)    


 


Glucose Level


 131 mg/dL


(70-99) 


 


 





 


Calcium Level


 9.0 mg/dL


(8.5-10.1) 


 


 





 


Total Bilirubin


 0.3 mg/dL


(0.2-1.0) 


 


 





 


Aspartate Amino Transf


(AST/SGOT) 10 U/L (15-37) 


 


 


 





 


Alanine Aminotransferase


(ALT/SGPT) 21 U/L (14-59) 


 


 


 





 


Alkaline Phosphatase


 88 U/L


() 


 


 





 


Total Protein


 6.6 g/dL


(6.4-8.2) 


 


 





 


Albumin


 2.8 g/dL


(3.4-5.0) 


 


 





 


Albumin/Globulin Ratio 0.7 (1.0-1.7)    


 


Glucose (Fingerstick)


 


 167 mg/dL


(70-99) 140 mg/dL


(70-99) 120 mg/dL


(70-99)


 


Test


 9/30/18


07:56 


 


 





 


Glucose (Fingerstick)


 124 mg/dL


(70-99) 


 


 











Laboratory Tests








Test


 9/29/18


11:22 9/29/18


16:59 9/29/18


20:54 9/30/18


07:56


 


Glucose (Fingerstick)


 167 mg/dL


(70-99) 140 mg/dL


(70-99) 120 mg/dL


(70-99) 124 mg/dL


(70-99)











Assessment/Plan


Assessment/Plan


This patient is 53-year-old woman who presented to emergency room with 

complaint of dizziness patient reports she previously have a history of 

dizziness she will have more symptoms when she is trying to get out of the bed 

quickly or trying to change in head position. Her symptoms were improving. She 

reports over the last few days she is having more symptoms. When she is trying 

to get out of the bed too quickly she will feel lightheaded, dizziness. Patient 

denies any difficulty speaking tingling numbness on the face or focal extremity 

weakness. Her symptoms are improving.





54-year-old woman with past medical history of positional dizziness with 

worsening of symptoms. Patient had CT scan done on the brain which was negative 

for any acute intracranial etiology. Patient reports she had MRI brain done 

recently which was negative. Patient is not interested in getting a repeat 

scan. K Doppler did not show any evidence of hemodynamically significant 

stenosis. We will also recommend the ENT follow-up as outpatient. Patient also 

has history of anxiety. Continue medication. Meclizine when necessary. 

Hypertension continue treat and monitor. Check for orthostatic hypotension. 

Diabetes continue treat and monitor. Plan discussed with patient in detail. She'

ll follow-up in neurology clinic.











ALDEN WALL MD Sep 30, 2018 09:57

## 2018-09-30 NOTE — PDOC
PROGRESS NOTES


Chief Complaint


Chief Complaint


presented to ER with  Vertigo, suspect vestibulitis with the inner ear 

disturbance.  





feels better today wants to go home, had back pain improved with toradal





History of Present Illness


History of Present Illness


1-vertigo vestibulitis


2-sinus retention cyst and mild sinusitis started Augmentin


3-HTN


4-DM


5-low back pain improved with NSAID





improving , wants to go home





Vitals


Vitals





Vital Signs








  Date Time  Temp Pulse Resp B/P (MAP) Pulse Ox O2 Delivery O2 Flow Rate FiO2


 


9/30/18 11:18 97.9 78 18 150/48 (82) 96 Room Air  





 97.9       











Physical Exam


General:  Alert, Oriented X3, Cooperative


Heart:  Regular rate, Normal S1, Normal S2


Lungs:  Clear


Abdomen:  Normal bowel sounds, Soft


Extremities:  No clubbing, No cyanosis


Skin:  No significant lesion





Labs


LABS





Laboratory Tests








Test


 9/29/18


11:22 9/29/18


16:59 9/29/18


20:54 9/30/18


07:56


 


Glucose (Fingerstick)


 167 mg/dL


(70-99) 140 mg/dL


(70-99) 120 mg/dL


(70-99) 124 mg/dL


(70-99)


 


Test


 9/30/18


11:13 


 


 





 


Glucose (Fingerstick)


 145 mg/dL


(70-99) 


 


 














Assessment and Plan


Assessmemt and Plan


Problems


Medical Problems:


(1) Dizziness


Status: Acute  











Comment


Review of Relevant


I have reviewed the following items blanca (where applicable) has been applied.


Labs





Laboratory Tests








Test


 9/28/18


12:22 9/28/18


17:22 9/28/18


20:50 9/29/18


03:40


 


Urine Collection Type Unknown    


 


Urine Color Yellow    


 


Urine Clarity Clear    


 


Urine pH 6.0    


 


Urine Specific Gravity 1.025    


 


Urine Protein


 Negative mg/dL


(NEG-TRACE) 


 


 





 


Urine Glucose (UA)


 Negative mg/dL


(NEG) 


 


 





 


Urine Ketones (Stick)


 Negative mg/dL


(NEG) 


 


 





 


Urine Blood Negative (NEG)    


 


Urine Nitrite Negative (NEG)    


 


Urine Bilirubin Negative (NEG)    


 


Urine Urobilinogen Dipstick


 1.0 mg/dL (0.2


mg/dL) 


 


 





 


Urine Leukocyte Esterase Negative (NEG)    


 


Urine RBC 0 /HPF (0-2)    


 


Urine WBC 1-4 /HPF (0-4)    


 


Urine Squamous Epithelial


Cells Mod /LPF 


 


 


 





 


Urine Bacteria 0 /HPF (0-FEW)    


 


Urine Mucus Mod /LPF    


 


Glucose (Fingerstick)


 


 192 mg/dL


(70-99) 225 mg/dL


(70-99) 





 


White Blood Count


 


 


 


 7.4 x10^3/uL


(4.0-11.0)


 


Red Blood Count


 


 


 


 4.74 x10^6/uL


(3.50-5.40)


 


Hemoglobin


 


 


 


 10.2 g/dL


(12.0-15.5)


 


Hematocrit


 


 


 


 31.6 %


(36.0-47.0)


 


Mean Corpuscular Volume    67 fL () 


 


Mean Corpuscular Hemoglobin    22 pg (25-35) 


 


Mean Corpuscular Hemoglobin


Concent 


 


 


 32 g/dL


(31-37)


 


Red Cell Distribution Width


 


 


 


 18.5 %


(11.5-14.5)


 


Platelet Count


 


 


 


 168 x10^3/uL


(140-400)


 


Neutrophils (%) (Auto)    42 % (31-73) 


 


Lymphocytes (%) (Auto)    49 % (24-48) 


 


Monocytes (%) (Auto)    8 % (0-9) 


 


Eosinophils (%) (Auto)    1 % (0-3) 


 


Basophils (%) (Auto)    1 % (0-3) 


 


Neutrophils # (Auto)


 


 


 


 3.1 x10^3uL


(1.8-7.7)


 


Lymphocytes # (Auto)


 


 


 


 3.6 x10^3/uL


(1.0-4.8)


 


Monocytes # (Auto)


 


 


 


 0.6 x10^3/uL


(0.0-1.1)


 


Eosinophils # (Auto)


 


 


 


 0.1 x10^3/uL


(0.0-0.7)


 


Basophils # (Auto)


 


 


 


 0.1 x10^3/uL


(0.0-0.2)


 


Sodium Level


 


 


 


 143 mmol/L


(136-145)


 


Potassium Level


 


 


 


 3.3 mmol/L


(3.5-5.1)


 


Chloride Level


 


 


 


 107 mmol/L


()


 


Carbon Dioxide Level


 


 


 


 28 mmol/L


(21-32)


 


Anion Gap    8 (6-14) 


 


Blood Urea Nitrogen


 


 


 


 13 mg/dL


(7-20)


 


Creatinine


 


 


 


 0.5 mg/dL


(0.6-1.0)


 


Estimated GFR


(Cockcroft-Gault) 


 


 


 149.8 





 


BUN/Creatinine Ratio    26 (6-20) 


 


Glucose Level


 


 


 


 131 mg/dL


(70-99)


 


Calcium Level


 


 


 


 9.0 mg/dL


(8.5-10.1)


 


Total Bilirubin


 


 


 


 0.3 mg/dL


(0.2-1.0)


 


Aspartate Amino Transf


(AST/SGOT) 


 


 


 10 U/L (15-37) 





 


Alanine Aminotransferase


(ALT/SGPT) 


 


 


 21 U/L (14-59) 





 


Alkaline Phosphatase


 


 


 


 88 U/L


()


 


Total Protein


 


 


 


 6.6 g/dL


(6.4-8.2)


 


Albumin


 


 


 


 2.8 g/dL


(3.4-5.0)


 


Albumin/Globulin Ratio    0.7 (1.0-1.7) 


 


Test


 9/29/18


11:22 9/29/18


16:59 9/29/18


20:54 9/30/18


07:56


 


Glucose (Fingerstick)


 167 mg/dL


(70-99) 140 mg/dL


(70-99) 120 mg/dL


(70-99) 124 mg/dL


(70-99)


 


Test


 9/30/18


11:13 


 


 





 


Glucose (Fingerstick)


 145 mg/dL


(70-99) 


 


 











Laboratory Tests








Test


 9/29/18


16:59 9/29/18


20:54 9/30/18


07:56 9/30/18


11:13


 


Glucose (Fingerstick)


 140 mg/dL


(70-99) 120 mg/dL


(70-99) 124 mg/dL


(70-99) 145 mg/dL


(70-99)








Medications





Current Medications


Lorazepam (Ativan) 5 mg 1X  ONCE IV ;  Start 9/28/18 at 11:30;  Stop 9/28/18 at 

12:47;  Status DC


Lorazepam (Ativan) 1 mg 1X  ONCE IM  Last administered on 9/28/18at 12:53;  

Start 9/28/18 at 13:00;  Stop 9/28/18 at 13:01;  Status DC


Ondansetron HCl (Zofran) 4 mg PRN Q8HRS  PRN IV NAUSEA/VOMITING;  Start 9/28/18 

at 15:45;  Stop 9/29/18 at 15:44;  Status DC


Sodium Chloride 1,000 ml @  150 mls/hr Q6H40M IV  Last administered on 9/29/ 18at 12:10;  Start 9/28/18 at 15:36;  Stop 9/29/18 at 15:35;  Status DC


Meclizine HCl (Antivert) 25 mg Q4HRS PO  Last administered on 9/30/18at 08:51;  

Start 9/28/18 at 16:00


Amlodipine Besylate (Norvasc) 5 mg DAILY PO  Last administered on 9/30/18at 08:

51;  Start 9/29/18 at 09:00


Atorvastatin Calcium (Lipitor) 40 mg HS PO  Last administered on 9/29/18at 20:04

;  Start 9/28/18 at 23:00


Clonazepam (KlonoPIN) 0.5 mg PRN BID  PRN PO ANXIETY / AGITATION Last 

administered on 9/28/18at 22:47;  Start 9/28/18 at 22:15


Hydrochlorothiazide (Microzide) 12.5 mg DAILY PO  Last administered on 9/30/ 18at 08:50;  Start 9/29/18 at 09:00


Insulin Human Lispro (HumaLOG) 0-5 UNITS TIDWMEALS SQ  Last administered on 9/29 /18at 12:07;  Start 9/29/18 at 08:00


Dextrose (Dextrose 50%-Water Syringe) 12.5 gm PRN Q15MIN  PRN IV SEE COMMENTS;  

Start 9/28/18 at 22:15


Losartan Potassium (Cozaar) 100 mg DAILY PO  Last administered on 9/30/18at 08:

51;  Start 9/29/18 at 09:00


Potassium Chloride (Klor-Con) 40 meq 1X  ONCE PO  Last administered on 9/29/ 18at 14:51;  Start 9/29/18 at 13:30;  Stop 9/29/18 at 13:31;  Status DC


Amoxicillin/ Clavulanate Potassium (Augmentin 875/ 125mg) 1 tab BID PO  Last 

administered on 9/30/18at 08:51;  Start 9/29/18 at 13:30


Lactobacillus Rhamnosus (Culturelle) 1 cap BID PO  Last administered on 9/30/ 18at 08:51;  Start 9/29/18 at 21:00





Active Scripts


Active


Benadryl (Diphenhydramine Hcl) 25 Mg Capsule 1 Cap PO QHS


Reported


Amlodipine Besylate 5 Mg Tablet 5 Mg PO DAILY


Klonopin (Clonazepam) 0.5 Mg Tablet 0.5 Mg PO PRN BID


Atorvastatin Calcium 20 Mg Tablet 20 Mg PO HS


Valsartan-Hctz 160-12.5 Mg Tab (Valsartan/Hydrochlorothiazide) 1 Each Tablet 1 

Each PO DAILY


Promethazine Hcl 25 Mg Supp.rect 25 Mg RC Q6H PRN


Metformin Hcl 500 Mg Tablet 1 Tab PO BID


Vitals/I & O





Vital Sign - Last 24 Hours








 9/29/18 9/29/18 9/29/18 9/29/18





 11:24 15:09 19:12 20:00


 


Temp 98.1 98.3 97.9 





 98.1 98.3 97.9 


 


Pulse 65 82 76 


 


Resp 20 20 18 


 


B/P (MAP) 112/76 (88) 125/61 (82) 149/77 (101) 


 


Pulse Ox 99 97 99 


 


O2 Delivery Room Air Room Air Room Air Room Air


 


    





    





 9/29/18 9/30/18 9/30/18 9/30/18





 23:15 03:42 07:58 08:00


 


Temp 97.9 98.0 98.2 





 97.9 98.0 98.2 


 


Pulse 80 73 68 


 


Resp 18 18 20 


 


B/P (MAP) 121/43 (69) 104/59 (74) 133/79 (97) 


 


Pulse Ox 99 99 100 


 


O2 Delivery Room Air Room Air Room Air Room Air


 


    





    





 9/30/18 9/30/18 9/30/18 





 08:51 08:51 11:18 


 


Temp   97.9 





   97.9 


 


Pulse 68 68 78 


 


Resp   18 


 


B/P (MAP) 133/79 133/79 150/48 (82) 


 


Pulse Ox   96 


 


O2 Delivery   Room Air 














Intake and Output   


 


 9/29/18 9/29/18 9/30/18





 15:00 23:00 07:00


 


Intake Total  500 ml 


 


Balance  500 ml 

















BRITTANY BREWER MD Sep 30, 2018 11:22

## 2018-09-30 NOTE — RAD
Clinical Indications:  Dizziness. 

 

Exam : Carotid Duplex with Grayscale Ultrasound and Spectral and Color 

Doppler Analysis: 

 

PQRS Compliance Statement - Stenosis calculations for CT, MR and 

conventional angiography are based upon measurement of the distal ICA 

diameter in accordance with the NASCET methodology.  Stenosis calculations

for carotid ultrasound studies are derived from validated velocity 

criteria which are known to correlate with the NASCET methodology.

 

Comparison study:  None available.

 

Findings:  The common, internal and external carotid arteries were 

examined by grayscale, color and spectral Doppler ultrasound.  Mild 

atherosclerotic plaque identified in the bilateral carotid bulbs and 

proximal internal carotid arteries.  Flow in both vertebral arteries was 

antegrade and normal.  The following are the velocities and ratios in the 

carotid arteries on both sides:

 

RIGHT ICA PV:                    70cm/sec

RIGHT CCA PV:                    92cm/sec

RIGHT ICA ED:                    22cm/sec

RIGHT IC/CCPV:                   Less than 1

RIGHT VERTEBRAL:                  antegrade flow

RIGHT % STENOSIS:                  Less than 50 percent

 

LEFT ICA PV:                    76cm/sec

LEFT CCA PV:                    102cm/sec

LEFT ICA ED:                    26cm/sec

LEFT IC/CCPV:                         Less than 1 

LEFT VERTEBRAL:                  antegrade flow

LEFT % STENOSIS:                  Less than 50 percent

 

<50% ICA Stenosis: PSV < 125cm/s (EDV < 40cm/s; SVR < 2.0)

50-69% ICA Stenosis: PSV < 125-229cm/s (EDV  40-99cm/s; SVR  2.0-3.9)

>70% ICA Stenosis: PSV > 230cm/s (EDV >100cm/s; SVR  >4.0)

 

Impression:  No evidence of hemodynamically significant stenosis.

 

Electronically signed by: Benito Aly MD (9/30/2018 8:45 AM) [facility]

## 2018-10-01 NOTE — PDOC3
*Discharge Summary*


Date of Admission:  Sep 28, 2018


Date of Discharge:  Sep 30, 2018


Admitting Diagnosis


Problems


Medical Problems:


(1) Dizziness


Status: Acute  








Final Diagnosis


1-vertigo vestibulitis


2-sinus retention cyst and mild sinusitis started Augmentin


3-HTN


4-DM


5-low back pain improved with NSAID


6- mild anemia advised work up as out pt


7-anxiety








Problems


Medical Problems:


(1) Dizziness


Status: Acute  





CONSULTS


neurology


Procedures


ct scan head neg, carotid doppler no significant stenosis, cxr neg


Brief Hospital Course


Ms. Gann  is a 63 old female who presented with dizziness with nausea upon 

moving fast, work up for CVA with CT head and carotid and neurology consult, CT 

showed sinus retention cyst started on ABx and to see ENT out pt due to 

labrynthitis and sinus retention cyst, she has mild anemia advised to F/u with  

PCP and have work yp for that , BS ok with metformin, Bp ok , she reported back 

pain before discharge improved with toradal requested NSAID script given 

meloxicam with meals and asked to F/U with PCP in 1-2 weeks


Disposition/Orders:  D/C to Home


CONDITION AT DISCHARGE:  Improved


Diet:  Cardiac, Consistent Carbohydrate


Home Meds


Active Scripts


Meloxicam (MOBIC) 15 Mg Tablet, 1 TAB PO DAILY, #30 TAB 1 Refill


   Prov:BRITTANY BREWER MD         9/30/18


Lactobacillus Rhamnosus Gg (CULTURELLE) 1 Each Cap.sprink, 1 CAP PO BID for 30 

Days, #60 CAP


   Prov:BRITTANY BREWER MD         9/30/18


Meclizine Hcl (MECLIZINE HCL) 12.5 Mg Tablet, 25 MG PO Q4HRS for 7 Days, #84 

TAB 0 Refills


   Prov:BRITTANY BREWER MD         9/30/18


Amoxicillin/Potassium Clav (AMOX TR-K -125 MG TAB) 1 Each Tablet, 1 TAB 

PO BID for 10 Days, #20 TAB 0 Refills


   Prov:BRITTANY BREWER MD         9/30/18


Diphenhydramine Hcl (BENADRYL) 25 Mg Capsule, 1 CAP PO QHS, #30 CAP 1 Refill


   Prov:FERMIN SLOAN MD         7/18/17


Reported Medications


Amlodipine Besylate (AMLODIPINE BESYLATE) 5 Mg Tablet, 5 MG PO DAILY, TAB


   9/28/18


Clonazepam (KLONOPIN) 0.5 Mg Tablet, 0.5 MG PO PRN BID, TAB


   9/28/18


Atorvastatin Calcium (ATORVASTATIN CALCIUM) 20 Mg Tablet, 20 MG PO HS for FOR 

CHOLESTEROL, #30 TAB 0 Refills


   6/16/16


Valsartan/Hydrochlorothiazide (VALSARTAN-HCTZ 160-12.5 MG TAB) 1 Each Tablet, 1 

EACH PO DAILY, TAB


   6/16/16


Promethazine Hcl (PROMETHAZINE HCL) 25 Mg Supp.rect, 25 MG RC Q6H PRN for NAUSEA

/VOMITING, SUPP.RECT


   6/16/16


Metformin Hcl (METFORMIN HCL) 500 Mg Tablet, 1 TAB PO BID, #180 TAB 1 Refill


   6/16/16


Scheduled


Amlodipine Besylate (Amlodipine Besylate), 5 MG PO DAILY, (Reported)


Amoxicillin/Potassium Clav (Amox Tr-K Clv 875-125 Mg Tab), 1 TAB PO BID


Atorvastatin Calcium (Atorvastatin Calcium), 20 MG PO HS, (Reported)


Clonazepam (Klonopin), 0.5 MG PO PRN BID, (Reported)


Diphenhydramine Hcl (Benadryl), 1 CAP PO QHS


Lactobacillus Rhamnosus Gg (Culturelle), 1 CAP PO BID


Meclizine Hcl (Meclizine Hcl), 25 MG PO Q4HRS


Meloxicam (Mobic), 1 TAB PO DAILY


Metformin Hcl (Metformin Hcl), 1 TAB PO BID, (Reported)


Valsartan/Hydrochlorothiazide (Valsartan-Hctz 160-12.5 Mg Tab), 1 EACH PO DAILY,

 (Reported)





Scheduled PRN


Promethazine Hcl (Promethazine Hcl), 25 MG RC Q6H PRN for NAUSEA/VOMITING, (

Reported)


Scripts


Meloxicam (MOBIC) 15 Mg Tablet


1 TAB PO DAILY, #30 TAB 1 Refill


   Prov: BRITTANY BREWER MD         9/30/18 


Lactobacillus Rhamnosus Gg (CULTURELLE) 1 Each Cap.sprink


1 CAP PO BID for 30 Days, #60 CAP


   Prov: BRITTANY BREWER MD         9/30/18 


Meclizine Hcl (MECLIZINE HCL) 12.5 Mg Tablet


25 MG PO Q4HRS for 7 Days, #84 TAB 0 Refills


   Prov: BRITTANY BREWER MD         9/30/18 


Amoxicillin/Potassium Clav (AMOX TR-K -125 MG TAB) 1 Each Tablet


1 TAB PO BID for 10 Days, #20 TAB 0 Refills


   Prov: BRITTANY BREWER MD         9/30/18


FOLLOW UP APPOINTMENT:  


F/U pcp 1-2 weeks for DONY, advised to discuss anemia work up with PCP, see


ENT for sinus retention cyst and f/u neurology clinic in 4 weeks


Time Spent


Total time spent with patient 45 minutes for coordination of care, counseling, 

and education. discussed above f/u recommendation in detail with patient











BRITTANY BREWER MD Oct 1, 2018 10:48

## 2019-02-23 ENCOUNTER — HOSPITAL ENCOUNTER (EMERGENCY)
Dept: HOSPITAL 61 - ER | Age: 64
Discharge: HOME | End: 2019-02-23
Payer: COMMERCIAL

## 2019-02-23 VITALS
DIASTOLIC BLOOD PRESSURE: 84 MMHG | DIASTOLIC BLOOD PRESSURE: 84 MMHG | SYSTOLIC BLOOD PRESSURE: 159 MMHG | SYSTOLIC BLOOD PRESSURE: 159 MMHG | DIASTOLIC BLOOD PRESSURE: 84 MMHG | SYSTOLIC BLOOD PRESSURE: 159 MMHG

## 2019-02-23 VITALS — BODY MASS INDEX: 42.52 KG/M2 | WEIGHT: 240 LBS | HEIGHT: 63 IN

## 2019-02-23 DIAGNOSIS — E11.9: ICD-10-CM
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
## 2019-02-23 DIAGNOSIS — Z88.5: ICD-10-CM

## 2019-02-23 DIAGNOSIS — I10: ICD-10-CM

## 2019-02-23 DIAGNOSIS — S82.434A: Primary | ICD-10-CM

## 2019-02-23 DIAGNOSIS — M16.0: ICD-10-CM

## 2019-02-23 DIAGNOSIS — M25.571: ICD-10-CM

## 2019-02-23 PROCEDURE — 99284 EMERGENCY DEPT VISIT MOD MDM: CPT

## 2019-02-23 PROCEDURE — 73502 X-RAY EXAM HIP UNI 2-3 VIEWS: CPT

## 2019-02-23 PROCEDURE — 29515 APPLICATION SHORT LEG SPLINT: CPT

## 2019-02-23 PROCEDURE — 96372 THER/PROPH/DIAG INJ SC/IM: CPT

## 2019-02-23 PROCEDURE — 72192 CT PELVIS W/O DYE: CPT

## 2019-02-23 PROCEDURE — 73610 X-RAY EXAM OF ANKLE: CPT

## 2019-02-23 NOTE — RAD
PQRS Compliance statement:

 

One or more of the following individualized dose reduction techniques were

utilized for this examination:

1. Automated exposure control.

2. Adjustment of the mA and/or kV according to patient size.

3. Use of iterative reconstruction technique.

 

Indication:RIGHT SIDED PELVIS & HIP PAIN, FELL AROUND 3:30pm TODAY

 

TECHNIQUE: CT pelvis without IV contrast with multiplanar reformats.

 

COMPARISON: Plain films from the same day

 

FINDINGS:

No acute fracture or dislocation. Mild bilateral hip joint osteoarthritis.

Lumbosacral spine is within normal limits. Intact pubic symphysis. No free

pelvic fluid or ascites. Normal appendix. No hip joint effusion. Multiple 

calcified fibroids. Urinary bladder is decompressed limiting optimal 

evaluation.

 

IMPRESSION: No acute fractures.

 

Electronically signed by: Wyatt Barnhart DO (2/23/2019 7:28 PM) [facility]

## 2019-02-23 NOTE — RAD
Indication: Fall. Right hip pain

 

TECHNIQUE: AP pelvis and 2 views of the right hip

 

COMPARISON: None

 

Findings/

impression:

No acute fracture or dislocation. Calcified fibroids.

 

Electronically signed by: Wyatt Barnhart DO (2/23/2019 7:43 PM) [facility]

## 2019-02-23 NOTE — RAD
Indication:Fall r ankle and hip pain

 

TECHNIQUE: 3 views of the right ankle

 

COMPARISON:None

 

FINDINGS/

impression:

Nondisplaced oblique fracture is seen through the distal fibula with 

extension to the ankle mortise. Ankle mortise is not widened. Mild soft 

tissue swelling overlying lateral malleolus.

 

Electronically signed by: Wyatt Barnhart DO (2/23/2019 7:22 PM) [facility]

## 2019-02-23 NOTE — PHYS DOC
Past Medical History


Past Medical History:  Diabetes-Type II, Hypertension


Additional Past Medical Histor:  cataracts


Past Surgical History:  No Surgical History


Additional Past Surgical Histo:  RIGHT KNEE SX, R&L BREAST BX; left knee


Alcohol Use:  None


Drug Use:  None





Adult General


Chief Complaint


Chief Complaint:  MECHANICAL FALL





HPI


HPI





Patient is a 63  year old AA female who presents to the ER with complaints of 

right hip and right ankle pain after slipping and falling in the driveway at 

1430 today. Pt denies any head injury, back pain, LOC, nausea, or vomiting. She 

reports lateral R ankle pain/swelling  and R hip pain at this time. Pt had 

complained of R knee pain but denies any knee pain at this time.





Review of Systems


Review of Systems





Constitutional: Denies fever or chills []


Respiratory: Denies cough or shortness of breath []


Cardiovascular: No additional information not addressed in HPI []


GI: Denies abdominal pain, nausea,or vomiting, 


Musculoskeletal: Denies back pain; see HPI


Integument: Denies rash or skin lesions


Neurologic: Denies headache, focal weakness or sensory changes []





Current Medications


Current Medications





Current Medications








 Medications


  (Trade)  Dose


 Ordered  Sig/Faraz  Start Time


 Stop Time Status Last Admin


Dose Admin


 


 Acetaminophen


  (Tylenol)  1,000 mg  1X  ONCE  2/23/19 17:15


 2/23/19 17:16 DC 2/23/19 17:18


1,000 MG


 


 Fentanyl Citrate


  (Fentanyl 2ml


 Vial)  50 mcg  1X  ONCE  2/23/19 20:15


 2/23/19 20:16 DC 2/23/19 20:09


50 MCG


 


 Ibuprofen


  (Motrin)  600 mg  1X  ONCE  2/23/19 20:00


 2/23/19 20:01 DC 2/23/19 20:09


600 MG











Allergies


Allergies





Allergies








Coded Allergies Type Severity Reaction Last Updated Verified


 


  codeine Adverse Reaction Intermediate VOMITING 6/9/15 No











Physical Exam


Physical Exam





Constitutional: Well developed, well nourished, no acute distress, non-toxic 

appearance. []


HENT: Normocephalic, atraumatic, bilateral external ears normal, nose normal. []


Eyes: PERRLA, conjunctiva normal, no discharge. [] 


Neck: Normal range of motion, no stridor. [] 


Cardiovascular:Heart rate regular rhythm


Lungs & Thorax: respirations even and unlabored, no retractions


Skin: Warm, dry, no erythema, no rash. [] 


Extremities: No cyanosis, no clubbing; right hip TTP, increased pain with ROM, 

R knee non-tender to palpation with full ROM; 1+ swelling and TTP of lateral 

right ankle with limited ROM of R ankle due to pain; 2+ pedal and posterior 

tibial pulses of RLE


Neurologic: Alert and oriented X 3, normal motor function, normal sensory 

function, no focal deficits noted. []


Psychologic: Affect normal, judgement normal, mood normal. []





Current Patient Data


Vital Signs





 Vital Signs








  Date Time  Temp Pulse Resp B/P (MAP) Pulse Ox O2 Delivery O2 Flow Rate FiO2


 


2/23/19 20:40   18     


 


2/23/19 20:37  71   99   


 


2/23/19 16:23 98.2   154/72 (99)  Room Air  





 98.2       











EKG


EKG


[]





Radiology/Procedures


Radiology/Procedures


PROCEDURE: ANKLE RIGHT 3V





Indication:Fall r ankle and hip pain


 


TECHNIQUE: 3 views of the right ankle


 


COMPARISON:None


 


FINDINGS/


impression:


Nondisplaced oblique fracture is seen through the distal fibula with 


extension to the ankle mortise. Ankle mortise is not widened. Mild soft 


tissue swelling overlying lateral malleolus.[]





PROCEDURE: CT PELVIS WO CONTRAST





PQRS Compliance statement:


 


One or more of the following individualized dose reduction techniques were


utilized for this examination:


1. Automated exposure control.


2. Adjustment of the mA and/or kV according to patient size.


3. Use of iterative reconstruction technique.


 


Indication:RIGHT SIDED PELVIS & HIP PAIN, FELL AROUND 3:30pm TODAY


 


TECHNIQUE: CT pelvis without IV contrast with multiplanar reformats.


 


COMPARISON: Plain films from the same day


 


FINDINGS:


No acute fracture or dislocation. Mild bilateral hip joint osteoarthritis.


Lumbosacral spine is within normal limits. Intact pubic symphysis. No free


pelvic fluid or ascites. Normal appendix. No hip joint effusion. Multiple 


calcified fibroids. Urinary bladder is decompressed limiting optimal 


evaluation.


 


IMPRESSION: No acute fractures.





PROCEDURE: HIP RIGHT 2V WITH PELVIS





Indication: Fall. Right hip pain


 


TECHNIQUE: AP pelvis and 2 views of the right hip


 


COMPARISON: None


 


Findings/


impression:


No acute fracture or dislocation. Calcified fibroids.





Course & Med Decision Making


Course & Med Decision Making


Pertinent Labs and Imaging studies reviewed. (See chart for details)


DX: fall on ice, closed right fibula fx, R hip pain


X-ray and CT of R hip/pelvis were negative. Xray of R ankle revealed 

nondisplaced fx of fibula. Pt was placed in a stirrup splint. She reports that 

she has a walker at home that she will use for ambulation. Pt was given 1000 mg 

of tylenol, 600 mg of motrin, and 50 mcg of fentanyl IM in the ER for pain 

control. She refused a prescription for narcotic pain medication. A 

prescription was written for ibuprofen 600 mg QID prn. Pt was given Dr. Lazaro's 

information for follow up. She verbalized and understanding of d/c instructions

, medications, follow-up, and return to ED instructions and was in agreement 

with POC.


[]





Dragon Disclaimer


Dragon Disclaimer


This electronic medical record was generated, in whole or in part, using a 

voice recognition dictation system.





Departure


Departure


Impression:  


 Primary Impression:  


 Right hip pain


 Additional Impressions:  


 Fall due to ice or snow


 Closed right fibular fracture


Disposition:  01 HOME, SELF-CARE


Condition:  STABLE


Referrals:  


TRAY LAZARO MD


Patient Instructions:  Fibular Fracture, Ankle, Adult, Undisplaced, Treated 

with Immobilization, Hip Pain





Additional Instructions:  


Fill prescription(s) and use as directed. Recommend application of ice, 

elevation, and rest of affected extremity. Wear the splint that was placed 

until follow up appointment with Dr. Lazaro, call on Monday morning for an 

appointment. Return to the ER if your symptoms worsen.


Scripts


Ibuprofen (IBUPROFEN) 600 Mg Tablet


600 MG PO PRN Q6HRS PRN for PAIN for 10 Days, #40 TAB 0 Refills


   Prov: SANDY DUGAN APRN         2/23/19





Problem Qualifiers








 Additional Impressions:  


 Fall due to ice or snow


 Encounter type:  initial encounter  Qualified Codes:  W00.9XXA - Unspecified 

fall due to ice and snow, initial encounter


 Closed right fibular fracture


 Encounter type:  initial encounter  Fibula location:  shaft  Fracture 

morphology:  oblique  Fracture alignment:  nondisplaced  Qualified Codes:  

S82.434A - Nondisplaced oblique fracture of shaft of right fibula, initial 

encounter for closed fracture








SANDY DUGAN APRN Feb 23, 2019 19:36

## 2019-07-23 ENCOUNTER — HOSPITAL ENCOUNTER (OUTPATIENT)
Dept: HOSPITAL 61 - KCIC MAMMO | Age: 64
Discharge: HOME | End: 2019-07-23
Attending: NURSE PRACTITIONER
Payer: COMMERCIAL

## 2019-07-23 DIAGNOSIS — R92.1: Primary | ICD-10-CM

## 2019-07-23 PROCEDURE — 76641 ULTRASOUND BREAST COMPLETE: CPT

## 2019-07-23 PROCEDURE — 77066 DX MAMMO INCL CAD BI: CPT

## 2019-07-23 NOTE — KCIC
Bilateral diagnostic digital mammograms:

 

Reason for examination: Right breast burning/pain behind nipple 3 weeks 

ago which has resolved.

 

Comparison is made to previous studies dated 4/10/2018 and 2/13/2017.

 

Interpretation was made with the benefit of CAD.

 

The skin and nipples show no abnormalities. No abnormal axillary lymph 

nodes are seen. The breast parenchyma shows scattered fibroglandular 

density. (Breast density: Category B.) There are small nodules probably 

representing intramammary lymph nodes seen bilaterally with one located 

medially at the 2:00 C position. There are no other dominant masses, 

suspicious calcifications or architectural distortions. Some benign 

calcifications are present.

 

Impression:

 

Small nodules seen which probably represent intramammary lymph nodes with 

one located at the 2:30 position of the right breast. Ultrasound to 

follow.

 

BI-RADS category 0: Incomplete. Needs additional imaging evaluation

 

 

Right breast ultrasound:

 

Ultrasound examination of the right breast was performed. In the 2:30 

position 12 cm from the nipple, there is a small circumscribed nodule 

measuring 4.3 x 1.9 x 3.9 mm in greatest dimensions with some echogenicity

centrally. This probably represents an intramammary lymph node. No other 

focal suspicious-appearing nodules are seen.

 

IMPRESSION:

 

Probable small intramammary lymph node at the 2:30 position which would 

correspond with these small nodule seen mammographically. Recommend 

reevaluation in 6 months with mammograms and ultrasound.

 

BI-RADS Category 3:  Probably Benign.

 

"Our facility is accredited by the [employer] 

Mammography Program."

 

This patient's information has been entered into a reminder system for the

patient to be notified with the results of her examination and a target 

date for the next mammogram. 

 

Electronically signed by: Keke Lo MD (7/23/2019 4:57 PM) [facility]

## 2019-07-23 NOTE — KCIC
Bilateral diagnostic digital mammograms:

 

Reason for examination: Right breast burning/pain behind nipple 3 weeks 

ago which has resolved.

 

Comparison is made to previous studies dated 4/10/2018 and 2/13/2017.

 

Interpretation was made with the benefit of CAD.

 

The skin and nipples show no abnormalities. No abnormal axillary lymph 

nodes are seen. The breast parenchyma shows scattered fibroglandular 

density. (Breast density: Category B.) There are small nodules probably 

representing intramammary lymph nodes seen bilaterally with one located 

medially at the 2:00 C position. There are no other dominant masses, 

suspicious calcifications or architectural distortions. Some benign 

calcifications are present.

 

Impression:

 

Small nodules seen which probably represent intramammary lymph nodes with 

one located at the 2:30 position of the right breast. Ultrasound to 

follow.

 

BI-RADS category 0: Incomplete. Needs additional imaging evaluation

 

 

Right breast ultrasound:

 

Ultrasound examination of the right breast was performed. In the 2:30 

position 12 cm from the nipple, there is a small circumscribed nodule 

measuring 4.3 x 1.9 x 3.9 mm in greatest dimensions with some echogenicity

centrally. This probably represents an intramammary lymph node. No other 

focal suspicious-appearing nodules are seen.

 

IMPRESSION:

 

Probable small intramammary lymph node at the 2:30 position which would 

correspond with these small nodule seen mammographically. Recommend 

reevaluation in 6 months with mammograms and ultrasound.

 

BI-RADS Category 3:  Probably Benign.

 

"Our facility is accredited by the [employer] 

Mammography Program."

 

This patient's information has been entered into a reminder system for the

patient to be notified with the results of her examination and a target 

date for the next mammogram. 

 

Electronically signed by: Keke Lo MD (7/23/2019 4:57 PM) [facility]

## 2020-01-21 ENCOUNTER — HOSPITAL ENCOUNTER (OUTPATIENT)
Dept: HOSPITAL 61 - KCIC MAMMO | Age: 65
Discharge: HOME | End: 2020-01-21
Attending: NURSE PRACTITIONER
Payer: COMMERCIAL

## 2020-01-21 DIAGNOSIS — N63.10: ICD-10-CM

## 2020-01-21 DIAGNOSIS — D24.1: Primary | ICD-10-CM

## 2020-01-21 PROCEDURE — 77065 DX MAMMO INCL CAD UNI: CPT

## 2020-01-21 PROCEDURE — 76641 ULTRASOUND BREAST COMPLETE: CPT

## 2020-01-21 NOTE — KCIC
Right breast diagnostic digital mammograms:

 

Reason for examination: Follow-up nodular density.

 

Comparison is made to previous study dated 7/23/2019.

 

Interpretation was made with the benefit of CAD.

 

The skin and nipple show no abnormalities. No abnormal axillary lymph 

nodes are seen. The breast parenchyma shows scattered fibroglandular 

density. (Breast density: Category B.) There are calcifying degenerating 

fibroadenoma. There continues to be a small nodular density posteriorly. 

This will be further evaluated with ultrasound. There are no new dominant 

masses, suspicious calcifications or architectural distortions. Some 

benign calcifications are present.

 

Impression:

 

Small nodules seen posteriorly. Ultrasound to follow.

 

BI-RADS category 0: Incomplete. Needs additional imaging evaluation.

 

 

Right breast ultrasound:

 

Comparison is made to previous study dated 7/23/2019.

 

Ultrasound examination of the right breast and axilla was performed.

 

At the 2:30 position 12 cm from the nipple, there continues to be a small 

3.5 mm hypoechoic lesion which is in parallel orientation. The appearance 

suggests a small intramammary lymph node. This shows a slight decrease in 

size. There are no other cystic or solid nodules seen. No abnormal 

appearing lymph nodes are seen in the axilla.

 

IMPRESSION:

 

Continued presence of a small nodule consistent with a probable 

intramammary lymph node. No suspicious abnormality seen. Recommend 6 month

follow-up with ultrasound which can be performed at the time of bilateral 

mammograms.

 

BI-RADS Category 3:  Probably Benign.

 

"Our facility is accredited by the [employer] 

Mammography Program."

 

This patient's information has been entered into a reminder system for the

patient to be notified with the results of her examination and a target 

date for the next mammogram.

 

Electronically signed by: Keke Lo MD (1/21/2020 3:39 PM) [place] [id number]

## 2020-01-21 NOTE — KCIC
Right breast diagnostic digital mammograms:

 

Reason for examination: Follow-up nodular density.

 

Comparison is made to previous study dated 7/23/2019.

 

Interpretation was made with the benefit of CAD.

 

The skin and nipple show no abnormalities. No abnormal axillary lymph 

nodes are seen. The breast parenchyma shows scattered fibroglandular 

density. (Breast density: Category B.) There are calcifying degenerating 

fibroadenoma. There continues to be a small nodular density posteriorly. 

This will be further evaluated with ultrasound. There are no new dominant 

masses, suspicious calcifications or architectural distortions. Some 

benign calcifications are present.

 

Impression:

 

Small nodules seen posteriorly. Ultrasound to follow.

 

BI-RADS category 0: Incomplete. Needs additional imaging evaluation.

 

 

Right breast ultrasound:

 

Comparison is made to previous study dated 7/23/2019.

 

Ultrasound examination of the right breast and axilla was performed.

 

At the 2:30 position 12 cm from the nipple, there continues to be a small 

3.5 mm hypoechoic lesion which is in parallel orientation. The appearance 

suggests a small intramammary lymph node. This shows a slight decrease in 

size. There are no other cystic or solid nodules seen. No abnormal 

appearing lymph nodes are seen in the axilla.

 

IMPRESSION:

 

Continued presence of a small nodule consistent with a probable 

intramammary lymph node. No suspicious abnormality seen. Recommend 6 month

follow-up with ultrasound which can be performed at the time of bilateral 

mammograms.

 

BI-RADS Category 3:  Probably Benign.

 

"Our facility is accredited by the [employer] 

Mammography Program."

 

This patient's information has been entered into a reminder system for the

patient to be notified with the results of her examination and a target 

date for the next mammogram.

 

Electronically signed by: Keke Lo MD (1/21/2020 3:39 PM) [place] [id number]

## 2020-06-12 ENCOUNTER — HOSPITAL ENCOUNTER (INPATIENT)
Dept: HOSPITAL 61 - ER | Age: 65
LOS: 1 days | Discharge: HOME | DRG: 641 | End: 2020-06-13
Attending: INTERNAL MEDICINE | Admitting: INTERNAL MEDICINE
Payer: COMMERCIAL

## 2020-06-12 VITALS — WEIGHT: 235.45 LBS | BODY MASS INDEX: 41.72 KG/M2 | HEIGHT: 63 IN

## 2020-06-12 VITALS — DIASTOLIC BLOOD PRESSURE: 64 MMHG | SYSTOLIC BLOOD PRESSURE: 140 MMHG

## 2020-06-12 VITALS — SYSTOLIC BLOOD PRESSURE: 141 MMHG | DIASTOLIC BLOOD PRESSURE: 61 MMHG

## 2020-06-12 VITALS — DIASTOLIC BLOOD PRESSURE: 88 MMHG | SYSTOLIC BLOOD PRESSURE: 172 MMHG

## 2020-06-12 DIAGNOSIS — E11.9: ICD-10-CM

## 2020-06-12 DIAGNOSIS — R63.0: ICD-10-CM

## 2020-06-12 DIAGNOSIS — Z79.899: ICD-10-CM

## 2020-06-12 DIAGNOSIS — F41.9: ICD-10-CM

## 2020-06-12 DIAGNOSIS — Z20.828: ICD-10-CM

## 2020-06-12 DIAGNOSIS — Z88.5: ICD-10-CM

## 2020-06-12 DIAGNOSIS — Z82.49: ICD-10-CM

## 2020-06-12 DIAGNOSIS — E87.6: Primary | ICD-10-CM

## 2020-06-12 DIAGNOSIS — E83.42: ICD-10-CM

## 2020-06-12 DIAGNOSIS — E78.00: ICD-10-CM

## 2020-06-12 DIAGNOSIS — Z84.89: ICD-10-CM

## 2020-06-12 DIAGNOSIS — I10: ICD-10-CM

## 2020-06-12 LAB
ALBUMIN SERPL-MCNC: 3.3 G/DL (ref 3.4–5)
ALBUMIN/GLOB SERPL: 1 {RATIO} (ref 1–1.7)
ALP SERPL-CCNC: 64 U/L (ref 46–116)
ALT SERPL-CCNC: 17 U/L (ref 14–59)
AMORPH SED URNS QL MICRO: PRESENT /HPF
ANION GAP SERPL CALC-SCNC: 13 MMOL/L (ref 6–14)
ANISOCYTOSIS BLD QL SMEAR: (no result)
APTT BLD: 23 SEC (ref 24–38)
APTT PPP: YELLOW S
AST SERPL-CCNC: 12 U/L (ref 15–37)
BACTERIA #/AREA URNS HPF: 0 /HPF
BASOPHILS # BLD AUTO: 0 X10^3/UL (ref 0–0.2)
BASOPHILS NFR BLD: 0 % (ref 0–3)
BILIRUB SERPL-MCNC: 0.5 MG/DL (ref 0.2–1)
BILIRUB UR QL STRIP: NEGATIVE
BUN SERPL-MCNC: 14 MG/DL (ref 7–20)
BUN/CREAT SERPL: 16 (ref 6–20)
CALCIUM SERPL-MCNC: 8.4 MG/DL (ref 8.5–10.1)
CHLORIDE SERPL-SCNC: 104 MMOL/L (ref 98–107)
CO2 SERPL-SCNC: 25 MMOL/L (ref 21–32)
CREAT SERPL-MCNC: 0.9 MG/DL (ref 0.6–1)
EOSINOPHIL NFR BLD: 0.1 X10^3/UL (ref 0–0.7)
EOSINOPHIL NFR BLD: 1 % (ref 0–3)
ERYTHROCYTE [DISTWIDTH] IN BLOOD BY AUTOMATED COUNT: 20.2 % (ref 11.5–14.5)
FIBRINOGEN PPP-MCNC: CLEAR MG/DL
GFR SERPLBLD BASED ON 1.73 SQ M-ARVRAT: 75.8 ML/MIN
GLOBULIN SER-MCNC: 3.3 G/DL (ref 2.2–3.8)
GLUCOSE SERPL-MCNC: 218 MG/DL (ref 70–99)
HCT VFR BLD CALC: 31.2 % (ref 36–47)
HGB BLD-MCNC: 9.7 G/DL (ref 12–15.5)
HYALINE CASTS #/AREA URNS LPF: (no result) /HPF
HYPOCHROMIA BLD QL SMEAR: (no result)
LIPASE: 80 U/L (ref 73–393)
LYMPHOCYTES # BLD: 3.5 X10^3/UL (ref 1–4.8)
LYMPHOCYTES NFR BLD AUTO: 37 % (ref 24–48)
MAGNESIUM SERPL-MCNC: 1.3 MG/DL (ref 1.8–2.4)
MCH RBC QN AUTO: 21 PG (ref 25–35)
MCHC RBC AUTO-ENTMCNC: 31 G/DL (ref 31–37)
MCV RBC AUTO: 68 FL (ref 79–100)
MICROCYTES BLD QL SMEAR: (no result)
MONO #: 0.4 X10^3/UL (ref 0–1.1)
MONOCYTES NFR BLD: 4 % (ref 0–9)
NEUT #: 5.7 X10^3/UL (ref 1.8–7.7)
NEUTROPHILS NFR BLD AUTO: 58 % (ref 31–73)
NITRITE UR QL STRIP: NEGATIVE
OVALOCYTES BLD QL SMEAR: (no result)
PH UR STRIP: 5.5 [PH]
PLATELET # BLD AUTO: 210 X10^3/UL (ref 140–400)
PLATELET # BLD EST: ADEQUATE 10*3/UL
POLYCHROMASIA BLD QL SMEAR: SLIGHT
POTASSIUM SERPL-SCNC: 2.9 MMOL/L (ref 3.5–5.1)
PROT SERPL-MCNC: 6.6 G/DL (ref 6.4–8.2)
PROT UR STRIP-MCNC: 100 MG/DL
PROTHROMBIN TIME: 13.8 SEC (ref 11.7–14)
RBC # BLD AUTO: 4.58 X10^6/UL (ref 3.5–5.4)
RBC #/AREA URNS HPF: (no result) /HPF (ref 0–2)
SODIUM SERPL-SCNC: 142 MMOL/L (ref 136–145)
SQUAMOUS #/AREA URNS LPF: (no result) /LPF
TARGETS BLD QL SMEAR: (no result)
UROBILINOGEN UR-MCNC: 1 MG/DL
WBC # BLD AUTO: 9.7 X10^3/UL (ref 4–11)
WBC #/AREA URNS HPF: (no result) /HPF (ref 0–4)

## 2020-06-12 PROCEDURE — 96375 TX/PRO/DX INJ NEW DRUG ADDON: CPT

## 2020-06-12 PROCEDURE — 96366 THER/PROPH/DIAG IV INF ADDON: CPT

## 2020-06-12 PROCEDURE — 85730 THROMBOPLASTIN TIME PARTIAL: CPT

## 2020-06-12 PROCEDURE — 80053 COMPREHEN METABOLIC PANEL: CPT

## 2020-06-12 PROCEDURE — 70450 CT HEAD/BRAIN W/O DYE: CPT

## 2020-06-12 PROCEDURE — 83690 ASSAY OF LIPASE: CPT

## 2020-06-12 PROCEDURE — 93005 ELECTROCARDIOGRAM TRACING: CPT

## 2020-06-12 PROCEDURE — 83550 IRON BINDING TEST: CPT

## 2020-06-12 PROCEDURE — 81001 URINALYSIS AUTO W/SCOPE: CPT

## 2020-06-12 PROCEDURE — 96361 HYDRATE IV INFUSION ADD-ON: CPT

## 2020-06-12 PROCEDURE — G0378 HOSPITAL OBSERVATION PER HR: HCPCS

## 2020-06-12 PROCEDURE — 85025 COMPLETE CBC W/AUTO DIFF WBC: CPT

## 2020-06-12 PROCEDURE — 99285 EMERGENCY DEPT VISIT HI MDM: CPT

## 2020-06-12 PROCEDURE — 83735 ASSAY OF MAGNESIUM: CPT

## 2020-06-12 PROCEDURE — 36415 COLL VENOUS BLD VENIPUNCTURE: CPT

## 2020-06-12 PROCEDURE — 83540 ASSAY OF IRON: CPT

## 2020-06-12 PROCEDURE — 85610 PROTHROMBIN TIME: CPT

## 2020-06-12 PROCEDURE — 84484 ASSAY OF TROPONIN QUANT: CPT

## 2020-06-12 PROCEDURE — 96365 THER/PROPH/DIAG IV INF INIT: CPT

## 2020-06-12 PROCEDURE — 82962 GLUCOSE BLOOD TEST: CPT

## 2020-06-12 RX ADMIN — DEXTROSE, SODIUM CHLORIDE, AND POTASSIUM CHLORIDE SCH MLS/HR: 5; .45; .15 INJECTION INTRAVENOUS at 18:28

## 2020-06-12 RX ADMIN — ENOXAPARIN SODIUM SCH MG: 40 INJECTION SUBCUTANEOUS at 20:41

## 2020-06-12 RX ADMIN — POTASSIUM CHLORIDE SCH MLS/HR: 7.46 INJECTION, SOLUTION INTRAVENOUS at 14:04

## 2020-06-12 RX ADMIN — POTASSIUM CHLORIDE SCH MLS/HR: 7.46 INJECTION, SOLUTION INTRAVENOUS at 12:54

## 2020-06-12 RX ADMIN — FOLIC ACID-PYRIDOXINE-CYANOCOBALAMIN TAB 2.5-25-2 MG SCH TAB: 2.5-25-2 TAB at 18:27

## 2020-06-12 RX ADMIN — ZINC SULFATE CAP 220 MG (50 MG ELEMENTAL ZN) SCH MG: 220 (50 ZN) CAP at 18:28

## 2020-06-12 RX ADMIN — POTASSIUM CHLORIDE SCH MLS/HR: 7.46 INJECTION, SOLUTION INTRAVENOUS at 15:45

## 2020-06-12 RX ADMIN — OXYCODONE HYDROCHLORIDE AND ACETAMINOPHEN SCH MG: 500 TABLET ORAL at 18:27

## 2020-06-12 RX ADMIN — POTASSIUM CHLORIDE SCH MLS/HR: 7.46 INJECTION, SOLUTION INTRAVENOUS at 14:45

## 2020-06-12 NOTE — PDOC1
History and Physical


Date of Admission


Date of Admission


DATE: 6/12/20 


TIME: 18:37





History of Present Illness


History of Present Illness


 Ms. Gann  is a 64  year old female who was brought here by EMS from home due

to dizziness, associate with nausea vomiting.  Dizziness became more severe with

head movement or upright position.  Patient denies any chest pain, no abdominal 

pain, no cough or fever.  Patient was admitted here in March 24, tested positive

for COVID 19.  Patient was discharged home on April 1, patient said she had not 

been feeling normal since.  Patient says sometimes some days she felt good and 

some day she felt  bad.





Past Medical History


Cardiovascular:  HTN


Psych:  Anxiety


Rheumatologic:  No pertinent hx





Family History


Family History:  High Cholestrol, Hypertension





Social History


Smoke:  No


ALCOHOL:  none


Drugs:  None





Current Problem List


Problem List


Problems


Medical Problems:


(1) Dizziness


Status: Acute  





(2) Hypokalemia


Status: Acute  





(3) Hypomagnesemia syndrome


Status: Acute  











Current Medications


Current Medications





Current Medications


Sodium Chloride 1,000 ml @  1,000 mls/hr 1X  ONCE IV  Last administered on 

6/12/20at 11:42;  Start 6/12/20 at 11:30;  Stop 6/12/20 at 12:29;  Status DC


Ondansetron HCl (Zofran) 4 mg 1X  ONCE IVP  Last administered on 6/12/20at 

11:42;  Start 6/12/20 at 11:30;  Stop 6/12/20 at 11:34;  Status DC


Metoclopramide HCl (Reglan Vial) 10 mg 1X  ONCE IVP  Last administered on 

6/12/20at 11:45;  Start 6/12/20 at 11:45;  Stop 6/12/20 at 11:46;  Status DC


Potassium Chloride/Water 100 ml @  100 mls/hr Q1H IV  Last administered on 

6/12/20at 15:45;  Start 6/12/20 at 12:45;  Stop 6/12/20 at 16:44;  Status DC


Magnesium Sulfate 50 ml @ 25 mls/hr 1X  ONCE IV  Last administered on 6/12/20at 

14:03;  Start 6/12/20 at 13:00;  Stop 6/12/20 at 14:59;  Status DC


Atorvastatin Calcium (Lipitor) 40 mg QHS PO ;  Start 6/12/20 at 21:00


Clonazepam (KlonoPIN) 0.5 mg PRN BID  PRN PO ANXIETY / AGITATION Last 

administered on 6/12/20at 17:57;  Start 6/12/20 at 17:30


Meclizine HCl (Antivert) 25 mg PRN Q6HRS  PRN PO DIZZINESS Last administered on 

6/12/20at 17:57;  Start 6/12/20 at 17:33


Citalopram Hydrobromide (CeleXA) 20 mg DAILY PO ;  Start 6/13/20 at 09:00


Metformin HCl (Glucophage) 1,000 mg BIDWMEALS PO  Last administered on 6/12/20at

17:57;  Start 6/12/20 at 18:00


Ondansetron HCl (Zofran Odt) 4 mg PRN Q6HRS  PRN PO NAUSEA/VOMITING;  Start 

6/12/20 at 17:45


Losartan Potassium (Cozaar) 100 mg DAILY PO ;  Start 6/13/20 at 09:00


Potassium Chloride (Klor-Con) 20 meq 1X  ONCE PO  Last administered on 6/12/20at

17:57;  Start 6/12/20 at 17:30;  Stop 6/12/20 at 17:33;  Status DC


Hydrochlorothiazide (Microzide) 12.5 mg DAILY PO ;  Start 6/13/20 at 09:00


Vitamin D (Vitamin D3) 5,000 unit DAILY PO ;  Start 6/13/20 at 09:00


Enoxaparin Sodium (Lovenox 40mg Syringe) 40 mg Q12HR SQ ;  Start 6/12/20 at 

21:00


Vitamin B Complex (Folbic Tablet) 1 tab DAILY PO  Last administered on 6/12/20at

18:27;  Start 6/12/20 at 18:15


Zinc Sulfate (Orazinc) 220 mg DAILY PO  Last administered on 6/12/20at 18:28;  

Start 6/12/20 at 18:15


Ascorbic Acid (Vitamin C) 500 mg DAILY PO  Last administered on 6/12/20at 18:27;

 Start 6/12/20 at 18:15


Potassium Chloride/Dextrose/ Sod Cl 1,000 ml @  100 mls/hr Q10H IV  Last 

administered on 6/12/20at 18:28;  Start 6/12/20 at 18:15





Active Scripts


Active


Meclizine Hcl 12.5 Mg Tablet 25 Mg PO Q4HRS 7 Days


Reported


Metformin HCl 500 Mg/5 Ml Solution 1,000 Mg PO BID


Zofran (Ondansetron Hcl) 4 Mg Tablet 1 Tab PO Q6HRS


Escitalopram Oxalate 10 Mg Tablet 1 Tab PO DAILY


Amlodipine Besylate 5 Mg Tablet 5 Mg PO DAILY


Klonopin (Clonazepam) 0.5 Mg Tablet 0.5 Mg PO PRN BID


Atorvastatin Calcium 20 Mg Tablet 40 Mg PO HS


Valsartan-Hctz 160-12.5 Mg Tab (Valsartan/Hydrochlorothiazide) 1 Each Tablet 1 

Each PO DAILY





Allergies


Allergies:  


Coded Allergies:  


     codeine (Unverified  Adverse Reaction, Intermediate, VOMITING, 6/9/15)





ROS


General:  YES: Chills, Fatigue, Malaise, Appetite


PSYCHOLOGICAL ROS:  YES: Anxiety, Irritablity, Sleep disturbances; 


   No: Behavioral Disorder, Concentration difficultie, Decreased libido, 

Depression, Disorientation, Hallucinations, Hostility, Memory difficulties, Mood

Swings, Obsessive thoughts, Physical abuse, Sexual abuse, Suicidal ideation, 

Other


Eyes:  No Blurry vision, No Decreased vision, No Double vision, No Dry eyes, No 

Excessive tearing, No Eye Pain, No Itchy Eyes, No Loss of vision, No 

Photophobia, No Scotomata, No Uses contacts, No Uses glasses, No Other


HEENT:  YES: Heacaches; 


   No: Visual Changes, Hearing change, Nasal congestion, Nasal discharge, Oral 

lesions, Sinus pain, Sore Throat, Epistaxis, Sneezing, Snoring, Tinnitus, 

Vertigo, Vocal changes, Other


Respiratory:  No: Cough, Hemoptysis, Orthopnea, Pleuritic Pain, Shortness of 

breath, SOB with excertion, Sputum Changes, Stridor, Tachypnea, Wheezing, Other


Cardiovascular:  No Chest Pain, No Palpitations, No Orthopnea, No Paroxysmal 

Noc. Dyspnea, No Edema, No Lt Headedness, No Other


Gastrointestinal:  Yes Nausea, Yes Abdominal Pain


Genitourinary:  No Dysuria, No Frequency, No Incontinence, No Hematuria, No 

Retention, No Discharge, No Urgency, No Pain, No Flank Pain, No Other, No , No ,

No , No , No , No , No 


Musculoskeletal:  Yes Joint Stiffness, Yes Muscular Weakness; 


   No Gait Disturbance, No Joint Pain, No Pain In:, No Swelling In:, No Other


Neurological:  Yes Weakness; 


   No Behavorial Changes, No Bowel/Bladder ControlChng, No Confusion, No 

Dizziness, No Gait Disturbance, No Headaches, No Impaired Coord/balance, No 

Memory Loss, No Numbness/Tingling, No Seizures, No Speech Problems, No Tremors, 

No Visual Changes, No Other


Skin:  No Dry Skin, No Eczema, No Hair Changes, No Lumps, No Mole Changes, No 

Mottling, No Nail Changes, No Pruritus, No Rash, No Skin Lesion Changes, No 

Other, No Acne





Physical Exam


General:  Alert, Cooperative, mild distress


HEENT:  Atraumatic, Mucous membr. moist/pink


Lungs:  Clear to auscultation, Normal air movement


Heart:  no gallops, no murmurs


Rectal Exam:  not examined


Extremities:  No cyanosis, No edema


Skin:  No rashes


Neuro:  Normal speech, Normal tone, Sensation intact, Cranial nerves 3-12 NL


Psych/Mental Status:  Mental status NL, Mood NL





Vitals


Vitals





Vital Signs








  Date Time  Temp Pulse Resp B/P (MAP) Pulse Ox O2 Delivery O2 Flow Rate FiO2


 


6/12/20 17:00 97.7 88 16 172/88 (116) 95 Room Air  





 97.7       











Labs


Labs





Laboratory Tests








Test


 6/12/20


11:55 6/12/20


12:10


 


White Blood Count


 9.7 x10^3/uL


(4.0-11.0) 





 


Red Blood Count


 4.58 x10^6/uL


(3.50-5.40) 





 


Hemoglobin


 9.7 g/dL


(12.0-15.5) 





 


Hematocrit


 31.2 %


(36.0-47.0) 





 


Mean Corpuscular Volume 68 fL ()  


 


Mean Corpuscular Hemoglobin 21 pg (25-35)  


 


Mean Corpuscular Hemoglobin


Concent 31 g/dL


(31-37) 





 


Red Cell Distribution Width


 20.2 %


(11.5-14.5) 





 


Platelet Count


 210 x10^3/uL


(140-400) 





 


Neutrophils (%) (Auto) 58 % (31-73)  


 


Lymphocytes (%) (Auto) 37 % (24-48)  


 


Monocytes (%) (Auto) 4 % (0-9)  


 


Eosinophils (%) (Auto) 1 % (0-3)  


 


Basophils (%) (Auto) 0 % (0-3)  


 


Neutrophils # (Auto)


 5.7 x10^3/uL


(1.8-7.7) 





 


Lymphocytes # (Auto)


 3.5 x10^3/uL


(1.0-4.8) 





 


Monocytes # (Auto)


 0.4 x10^3/uL


(0.0-1.1) 





 


Eosinophils # (Auto)


 0.1 x10^3/uL


(0.0-0.7) 





 


Basophils # (Auto)


 0.0 x10^3/uL


(0.0-0.2) 





 


Platelet Estimate


 Adequate


(ADEQUATE) 





 


Giant Platelets Occ  


 


Polychromasia Slight  


 


Hypochromasia Mod  


 


Anisocytosis Mod  


 


Microcytosis Marked  


 


Target Cells Occ  


 


Ovalocytes Few  


 


Prothrombin Time


 13.8 SEC


(11.7-14.0) 





 


Prothromb Time International


Ratio 1.1 (0.8-1.1) 


 





 


Activated Partial


Thromboplast Time 23 SEC (24-38) 


 





 


Sodium Level


 142 mmol/L


(136-145) 





 


Potassium Level


 2.9 mmol/L


(3.5-5.1) 





 


Chloride Level


 104 mmol/L


() 





 


Carbon Dioxide Level


 25 mmol/L


(21-32) 





 


Anion Gap 13 (6-14)  


 


Blood Urea Nitrogen


 14 mg/dL


(7-20) 





 


Creatinine


 0.9 mg/dL


(0.6-1.0) 





 


Estimated GFR


(Cockcroft-Gault) 75.8 


 





 


BUN/Creatinine Ratio 16 (6-20)  


 


Glucose Level


 218 mg/dL


(70-99) 





 


Calcium Level


 8.4 mg/dL


(8.5-10.1) 





 


Magnesium Level


 1.3 mg/dL


(1.8-2.4) 





 


Total Bilirubin


 0.5 mg/dL


(0.2-1.0) 





 


Aspartate Amino Transf


(AST/SGOT) 12 U/L (15-37) 


 





 


Alanine Aminotransferase


(ALT/SGPT) 17 U/L (14-59) 


 





 


Alkaline Phosphatase


 64 U/L


() 





 


Troponin I Quantitative


 < 0.017 ng/mL


(0.000-0.055) 





 


Total Protein


 6.6 g/dL


(6.4-8.2) 





 


Albumin


 3.3 g/dL


(3.4-5.0) 





 


Albumin/Globulin Ratio 1.0 (1.0-1.7)  


 


Lipase


 80 U/L


() 





 


Urine Collection Type  U cath 


 


Urine Color  Yellow 


 


Urine Clarity  Clear 


 


Urine pH  5.5 (<5.0-8.0) 


 


Urine Specific Gravity


 


 1.020


(1.000-1.030)


 


Urine Protein


 


 100 mg/dL


(NEG-TRACE)


 


Urine Glucose (UA)


 


 100 mg/dL


(NEG)


 


Urine Ketones (Stick)  15 mg/dL (NEG) 


 


Urine Blood  Moderate (NEG) 


 


Urine Nitrite  Negative (NEG) 


 


Urine Bilirubin  Negative (NEG) 


 


Urine Urobilinogen Dipstick


 


 1.0 mg/dL (0.2


mg/dL)


 


Urine Leukocyte Esterase  Negative (NEG) 


 


Urine RBC


 


 6-10 /HPF


(0-2)


 


Urine WBC  1-4 /HPF (0-4) 


 


Urine Squamous Epithelial


Cells 


 Few /LPF 





 


Urine Transitional Epithelial


Cells 


 Mod /LPF 





 


Urine Amorphous Sediment  Present /HPF 


 


Urine Bacteria  0 /HPF (0-FEW) 


 


Urine Hyaline Casts  Moderate /HPF 


 


Urine Mucus  Marked /LPF 








Laboratory Tests








Test


 6/12/20


11:55 6/12/20


12:10


 


White Blood Count


 9.7 x10^3/uL


(4.0-11.0) 





 


Red Blood Count


 4.58 x10^6/uL


(3.50-5.40) 





 


Hemoglobin


 9.7 g/dL


(12.0-15.5) 





 


Hematocrit


 31.2 %


(36.0-47.0) 





 


Mean Corpuscular Volume 68 fL ()  


 


Mean Corpuscular Hemoglobin 21 pg (25-35)  


 


Mean Corpuscular Hemoglobin


Concent 31 g/dL


(31-37) 





 


Red Cell Distribution Width


 20.2 %


(11.5-14.5) 





 


Platelet Count


 210 x10^3/uL


(140-400) 





 


Neutrophils (%) (Auto) 58 % (31-73)  


 


Lymphocytes (%) (Auto) 37 % (24-48)  


 


Monocytes (%) (Auto) 4 % (0-9)  


 


Eosinophils (%) (Auto) 1 % (0-3)  


 


Basophils (%) (Auto) 0 % (0-3)  


 


Neutrophils # (Auto)


 5.7 x10^3/uL


(1.8-7.7) 





 


Lymphocytes # (Auto)


 3.5 x10^3/uL


(1.0-4.8) 





 


Monocytes # (Auto)


 0.4 x10^3/uL


(0.0-1.1) 





 


Eosinophils # (Auto)


 0.1 x10^3/uL


(0.0-0.7) 





 


Basophils # (Auto)


 0.0 x10^3/uL


(0.0-0.2) 





 


Platelet Estimate


 Adequate


(ADEQUATE) 





 


Giant Platelets Occ  


 


Polychromasia Slight  


 


Hypochromasia Mod  


 


Anisocytosis Mod  


 


Microcytosis Marked  


 


Target Cells Occ  


 


Ovalocytes Few  


 


Prothrombin Time


 13.8 SEC


(11.7-14.0) 





 


Prothromb Time International


Ratio 1.1 (0.8-1.1) 


 





 


Activated Partial


Thromboplast Time 23 SEC (24-38) 


 





 


Sodium Level


 142 mmol/L


(136-145) 





 


Potassium Level


 2.9 mmol/L


(3.5-5.1) 





 


Chloride Level


 104 mmol/L


() 





 


Carbon Dioxide Level


 25 mmol/L


(21-32) 





 


Anion Gap 13 (6-14)  


 


Blood Urea Nitrogen


 14 mg/dL


(7-20) 





 


Creatinine


 0.9 mg/dL


(0.6-1.0) 





 


Estimated GFR


(Cockcroft-Gault) 75.8 


 





 


BUN/Creatinine Ratio 16 (6-20)  


 


Glucose Level


 218 mg/dL


(70-99) 





 


Calcium Level


 8.4 mg/dL


(8.5-10.1) 





 


Magnesium Level


 1.3 mg/dL


(1.8-2.4) 





 


Total Bilirubin


 0.5 mg/dL


(0.2-1.0) 





 


Aspartate Amino Transf


(AST/SGOT) 12 U/L (15-37) 


 





 


Alanine Aminotransferase


(ALT/SGPT) 17 U/L (14-59) 


 





 


Alkaline Phosphatase


 64 U/L


() 





 


Troponin I Quantitative


 < 0.017 ng/mL


(0.000-0.055) 





 


Total Protein


 6.6 g/dL


(6.4-8.2) 





 


Albumin


 3.3 g/dL


(3.4-5.0) 





 


Albumin/Globulin Ratio 1.0 (1.0-1.7)  


 


Lipase


 80 U/L


() 





 


Urine Collection Type  U cath 


 


Urine Color  Yellow 


 


Urine Clarity  Clear 


 


Urine pH  5.5 (<5.0-8.0) 


 


Urine Specific Gravity


 


 1.020


(1.000-1.030)


 


Urine Protein


 


 100 mg/dL


(NEG-TRACE)


 


Urine Glucose (UA)


 


 100 mg/dL


(NEG)


 


Urine Ketones (Stick)  15 mg/dL (NEG) 


 


Urine Blood  Moderate (NEG) 


 


Urine Nitrite  Negative (NEG) 


 


Urine Bilirubin  Negative (NEG) 


 


Urine Urobilinogen Dipstick


 


 1.0 mg/dL (0.2


mg/dL)


 


Urine Leukocyte Esterase  Negative (NEG) 


 


Urine RBC


 


 6-10 /HPF


(0-2)


 


Urine WBC  1-4 /HPF (0-4) 


 


Urine Squamous Epithelial


Cells 


 Few /LPF 





 


Urine Transitional Epithelial


Cells 


 Mod /LPF 





 


Urine Amorphous Sediment  Present /HPF 


 


Urine Bacteria  0 /HPF (0-FEW) 


 


Urine Hyaline Casts  Moderate /HPF 


 


Urine Mucus  Marked /LPF 











VTE Prophylaxis Ordered


VTE Prophylaxis Devices:  No


VTE Pharmacological Prophylaxi:  Yes





Assessment/Plan


Assessment/Plan


weakness, dizzyness


orthostatis - IV fluid





concern for return or reinfection of COVID,   reswab sent


will do full vitamins,    lovenox ordered,   consider BID if tests pos,.


hypokalemia


anorexia,





Justicifation of Admission Dx:


Justifications for Admission:


Justification of Admission Dx:  Yes


Comments:


suspect COVId,  weakness, debility,   hypokalemia, anorexia











CAROL CARTER MD                 Jun 12, 2020 18:41

## 2020-06-12 NOTE — EKG
[facility]

              [address] [place] [address]

Test Date:    2020               Test Time:    11:41:21

Pat Name:     ISABEL SEYMOUR            Department:   

Patient ID:   [id number]           Room:          

Gender:       F                        Technician:   

:          [id number]               Requested By: LUIS FLORES

Order Number: [id number]           Reading MD:   Ernesto Galloway

                                 Measurements

Intervals                              Axis          

Rate:         70                       P:            29

DC:           168                      QRS:          0

QRSD:         94                       T:            115

QT:           466                                    

QTc:          507                                    

                           Interpretive Statements

SINUS RHYTHM

LEFTWARD AXIS

NONSPECIFIC ST-T WAVE CHANGES.



Electronically Signed On 2020 16:49:55 CDT by Ernesto Galloway

## 2020-06-12 NOTE — NUR
The patient, ISABEL SEYMOUR, 63 y/o, F admitted by CAROL CARTER MD, was given written 
information regarding hospital policies, unit procedures and contact persons.  



Valuables were checked and left at bedside .





Patient placed in isolation due to pending COVID 19. Patient did not voice any concerns at 
time of admission. Patient daughter spoke with nurse and updated.

## 2020-06-12 NOTE — RAD
PQRS Compliance Statement:

 

One or more of the following individualized dose reduction techniques were

utilized for this examination:  

1. Automated exposure control  

2. Adjustment of the mA and/or kV according to patient size  

3. Use of iterative reconstruction technique

 

 

CT HEAD WITHOUT CONTRAST

 

History: Reason: dizziness / 

 

Comparison: CT head without contrast, September 28, 2018.

 

Technique: Axial images are obtained of the head from the skull base 

through the vertex without IV contrast.

 

Findings: 

No mass-effect, midline shift, extra-axial fluid collection, hemorrhage, 

or obvious acute infarction is identified.  Basilar cisterns are patent.  

 

The ventricles and sulci are prominent, consistent with age-related 

cerebral atrophy. There is minimal periventricular white matter 

hypoattenuation.  This is a nonspecific finding but is commonly due to 

chronic small vessel ischemic disease.  

 

Bone windows demonstrate no acute calvarial abnormality. 

There is large left maxillary sinus mucous retention cyst or polyp. Small 

right sphenoid sinus mucous retention cyst or polyp. No air-fluid level is

seen. Mastoid air cells are well aerated. 

 

IMPRESSION:

No acute intracranial abnormality.

 

Electronically signed by: Cm Elizabeth MD (6/12/2020 2:18 PM) [facility]

## 2020-06-12 NOTE — PHYS DOC
Past Medical History


Past Medical History:  Diabetes-Type II, High Cholesterol, Hypertension


Additional Past Medical Histor:  cataracts


Past Surgical History:  No Surgical History


Additional Past Surgical Histo:  RIGHT KNEE SX, R&L BREAST BX; left knee


Smoking Status:  Never Smoker


Alcohol Use:  None


Drug Use:  None





General Adult


EDM:


Chief Complaint:  NAUSEA/VOMITING/DIARRHA





HPI:


HPI:





Patient is a 64  year old female who was brought here by EMS from home due to 

dizziness, associate with nausea vomiting.  Dizziness became more severe with 

head movement or upright position.  Patient denies any chest pain, no abdominal 

pain, no cough or fever.  Patient was admitted here in , tested positive

for COVID 19.  Patient was discharged home on , patient said she had not 

been feeling normal since.  Patient says sometimes some days she felt good and 

some day she felt  bad.





Review of Systems:


Review of Systems:


Constitutional:  Denies fever or chills. []


Eyes:  Denies change in visual acuity. []


HENT:  Denies nasal congestion or sore throat. [] 


Respiratory:  Denies cough or shortness of breath. [] 


Cardiovascular:  Denies chest pain or edema. [] 


GI:  Denies abdominal pain, POSITIVE FOR nausea, vomiting, NO  bloody stools or 

diarrhea. [] 


:  Denies dysuria. [] 


Musculoskeletal:  Denies back pain or joint pain. [] 


Integument:  Denies rash. [] 


Neurologic:  Denies headache, focal weakness or sensory changes. POSITIVE FOR 

DIZZINESS.


Endocrine:  Denies polyuria or polydipsia. [] 


Lymphatic:  Denies swollen glands. [] 


Psychiatric:  Denies depression or anxiety. []





Heart Score:


Risk Factors:


Risk Factors:  DM, Current or recent (<one month) smoker, HTN, HLP, family 

history of CAD, obesity.


Risk Scores:


Score 0 - 3:  2.5% MACE over next 6 weeks - Discharge Home


Score 4 - 6:  20.3% MACE over next 6 weeks - Admit for Clinical Observation


Score 7 - 10:  72.7% MACE over next 6 weeks - Early Invasive Strategies





Current Medications:





Current Medications








 Medications


  (Trade)  Dose


 Ordered  Sig/Faraz  Start Time


 Stop Time Status Last Admin


Dose Admin


 


 Magnesium Sulfate  50 ml @ 25


 mls/hr  1X  ONCE  20 13:00


 20 14:59  20 14:03


25 MLS/HR


 


 Metoclopramide HCl


  (Reglan Vial)  10 mg  1X  ONCE  20 11:45


 20 11:46 DC 20 11:45


10 MG


 


 Ondansetron HCl


  (Zofran)  4 mg  1X  ONCE  20 11:30


 20 11:34 DC 20 11:42


4 MG


 


 Potassium


 Chloride/Water  100 ml @ 


 100 mls/hr  Q1H  20 12:45


 20 16:44  20 14:04


100 MLS/HR


 


 Sodium Chloride  1,000 ml @ 


 1,000 mls/hr  1X  ONCE  20 11:30


 20 12:29 DC 20 11:42


1,000 MLS/HR











Allergies:


Allergies:





Allergies








Coded Allergies Type Severity Reaction Last Updated Verified


 


  codeine Adverse Reaction Intermediate VOMITING 6/9/15 No











Physical Exam:


PE:





Constitutional: Well developed, well nourished, no acute distress, non-toxic 

appearance. []


HENT: Normocephalic, atraumatic, bilateral external ears normal, oropharynx 

moist, no oral exudates, nose normal. []


Eyes: PERRLA, EOMI, conjunctiva normal, no discharge. [] 


Neck: Normal range of motion, no tenderness, supple, no stridor. [] 


Cardiovascular:Heart rate regular rhythm, no murmur []


Lungs & Thorax:  Bilateral breath sounds clear to auscultation []


Abdomen: Bowel sounds normal, soft, no tenderness, no masses, no pulsatile 

masses. [] 


Skin: Warm, dry, no erythema, no rash. [] 


Back: No tenderness, no CVA tenderness. [] 


Extremities: No tenderness, no cyanosis, no clubbing, ROM intact, no edema. [] 


Neurologic: Alert and oriented X 3, normal motor function, normal sensory 

function, no focal deficits noted. []


Psychologic: Affect normal, judgement normal, mood normal. []





Current Patient Data:


Labs:





                                Laboratory Tests








Test


 20


11:55 20


12:10


 


White Blood Count


 9.7 x10^3/uL


(4.0-11.0) 





 


Red Blood Count


 4.58 x10^6/uL


(3.50-5.40) 





 


Hemoglobin


 9.7 g/dL


(12.0-15.5)  L 





 


Hematocrit


 31.2 %


(36.0-47.0)  L 





 


Mean Corpuscular Volume


 68 fL ()


L 





 


Mean Corpuscular Hemoglobin


 21 pg (25-35)


L 





 


Mean Corpuscular Hemoglobin


Concent 31 g/dL


(31-37) 





 


Red Cell Distribution Width


 20.2 %


(11.5-14.5)  H 





 


Platelet Count


 210 x10^3/uL


(140-400) 





 


Neutrophils (%) (Auto) 58 % (31-73)   


 


Lymphocytes (%) (Auto) 37 % (24-48)   


 


Monocytes (%) (Auto) 4 % (0-9)   


 


Eosinophils (%) (Auto) 1 % (0-3)   


 


Basophils (%) (Auto) 0 % (0-3)   


 


Neutrophils # (Auto)


 5.7 x10^3/uL


(1.8-7.7) 





 


Lymphocytes # (Auto)


 3.5 x10^3/uL


(1.0-4.8) 





 


Monocytes # (Auto)


 0.4 x10^3/uL


(0.0-1.1) 





 


Eosinophils # (Auto)


 0.1 x10^3/uL


(0.0-0.7) 





 


Basophils # (Auto)


 0.0 x10^3/uL


(0.0-0.2) 





 


Platelet Estimate


 Adequate


(ADEQUATE) 





 


Giant Platelets Occ   


 


Polychromasia Slight   


 


Hypochromasia Mod   


 


Anisocytosis Mod   


 


Microcytosis Marked   


 


Target Cells Occ   


 


Ovalocytes Few   


 


Prothrombin Time


 13.8 SEC


(11.7-14.0) 





 


Prothrombin Time INR 1.1 (0.8-1.1)   


 


Activated Partial


Thromboplast Time 23 SEC (24-38)


L 





 


Sodium Level


 142 mmol/L


(136-145) 





 


Potassium Level


 2.9 mmol/L


(3.5-5.1)  *L 





 


Chloride Level


 104 mmol/L


() 





 


Carbon Dioxide Level


 25 mmol/L


(21-32) 





 


Anion Gap 13 (6-14)   


 


Blood Urea Nitrogen


 14 mg/dL


(7-20) 





 


Creatinine


 0.9 mg/dL


(0.6-1.0) 





 


Estimated GFR


(Cockcroft-Gault) 75.8  


 





 


BUN/Creatinine Ratio 16 (6-20)   


 


Glucose Level


 218 mg/dL


(70-99)  H 





 


Calcium Level


 8.4 mg/dL


(8.5-10.1)  L 





 


Magnesium Level


 1.3 mg/dL


(1.8-2.4)  L 





 


Total Bilirubin


 0.5 mg/dL


(0.2-1.0) 





 


Aspartate Amino Transferase


(AST) 12 U/L (15-37)


L 





 


Alanine Aminotransferase (ALT)


 17 U/L (14-59)


 





 


Alkaline Phosphatase


 64 U/L


() 





 


Troponin I Quantitative


 < 0.017 ng/mL


(0.000-0.055) 





 


Total Protein


 6.6 g/dL


(6.4-8.2) 





 


Albumin


 3.3 g/dL


(3.4-5.0)  L 





 


Albumin/Globulin Ratio 1.0 (1.0-1.7)   


 


Lipase


 80 U/L


() 





 


Urine Collection Type  U cath  


 


Urine Color  Yellow  


 


Urine Clarity  Clear  


 


Urine pH


 


 5.5 (<5.0-8.0)





 


Urine Specific Gravity


 


 1.020


(1.000-1.030)


 


Urine Protein


 


 100 mg/dL


(NEG-TRACE)


 


Urine Glucose (UA)


 


 100 mg/dL


(NEG)


 


Urine Ketones (Stick)


 


 15 mg/dL (NEG)





 


Urine Blood


 


 Moderate (NEG)





 


Urine Nitrite


 


 Negative (NEG)





 


Urine Bilirubin


 


 Negative (NEG)





 


Urine Urobilinogen Dipstick


 


 1.0 mg/dL (0.2


mg/dL)


 


Urine Leukocyte Esterase


 


 Negative (NEG)





 


Urine RBC


 


 6-10 /HPF


(0-2)


 


Urine WBC


 


 1-4 /HPF (0-4)





 


Urine Squamous Epithelial


Cells 


 Few /LPF  





 


Urine Transitional Epithelial


Cells 


 Mod /LPF  





 


Urine Amorphous Sediment  Present /HPF  


 


Urine Bacteria


 


 0 /HPF (0-FEW)





 


Urine Hyaline Casts  Moderate /HPF  


 


Urine Mucus  Marked /LPF  





                                Laboratory Tests


20 11:55








                                Laboratory Tests


20 11:55








Vital Signs:





                                   Vital Signs








  Date Time  Temp Pulse Resp B/P (MAP) Pulse Ox O2 Delivery O2 Flow Rate FiO2


 


20 12:57  78  132/61 (84) 100 Room Air  


 


20 11:01 96.7  19     





 96.7       











EKG:


EKG:


EKG was done at 1141, heart rate of 70 bpm, sinus rhythm, no ST segment 

elevation.





Radiology/Procedures:


Radiology/Procedures:


[][facility]


                    [address] [place] [address] [phone]


                                        


                                 IMAGING REPORT





                                     Signed





PATIENT: ISABEL SEYMOUR  ACCOUNT: [id number]     MRN#: [id number]


: 1955           LOCATION: ER              AGE: 64


SEX: F                    EXAM DT: 20         ACCESSION#: [id number]


STATUS: REG ER            ORD. PHYSICIAN: LUIS FLORES DO


REASON: dizziness


PROCEDURE: CT HEAD WO CONTRAST





PQRS Compliance Statement:


 


One or more of the following individualized dose reduction techniques were


utilized for this examination:  


1. Automated exposure control  


2. Adjustment of the mA and/or kV according to patient size  


3. Use of iterative reconstruction technique


 


 


CT HEAD WITHOUT CONTRAST


 


History: Reason: dizziness / 


 


Comparison: CT head without contrast, 2018.


 


Technique: Axial images are obtained of the head from the skull base 


through the vertex without IV contrast.


 


Findings: 


No mass-effect, midline shift, extra-axial fluid collection, hemorrhage, 


or obvious acute infarction is identified.  Basilar cisterns are patent.  


 


The ventricles and sulci are prominent, consistent with age-related 


cerebral atrophy. There is minimal periventricular white matter 


hypoattenuation.  This is a nonspecific finding but is commonly due to 


chronic small vessel ischemic disease.  


 


Bone windows demonstrate no acute calvarial abnormality. 


There is large left maxillary sinus mucous retention cyst or polyp. Small 


right sphenoid sinus mucous retention cyst or polyp. No air-fluid level is


seen. Mastoid air cells are well aerated. 


 


IMPRESSION:


No acute intracranial abnormality.


 


Electronically signed by: Cm Elizabeth MD (2020 2:18 PM) [facility]














DICTATED and SIGNED BY:     CM ELIZABETH MD


DATE:     20 1417





Course & Med Decision Making:


Course & Med Decision Making


Pertinent Labs and Imaging studies reviewed. (See chart for details)





Patient is a 64-year-old female who was evaluated in the ER due to dizziness 

associate with nausea vomiting.  Patient was found to have low potassium and low

 magnesium.  Patient was given multiple doses of Zofran, and Reglan IV however 

whenever she sits up or moves her head she become dizzy, become nauseous again. 

 Patient will be  admitted to hospital for treatment.  Discussed with Dr. Carter 

who agreed to admit patient.  








Patient was evaluated by this physician who worn full PPE included  N95 MASK, 

GOGGLE, GLOVE, GOWN.





Dragon Disclaimer:


Dragon Disclaimer:


This electronic medical record was generated, in whole or in part, using a voice

 recognition dictation system.





Departure


Departure


Impression:  


   Primary Impression:  


   Hypokalemia


   Additional Impressions:  


   Hypomagnesemia syndrome


   Dizziness


Disposition:  09 ADMITTED AS INPATIENT


Admitting Physician:  [facility] (Dr. CARTER)


Condition:  IMPROVED


Referrals:  


NIRAV RUBIN (PCP)





Justicifation of Admission Dx:


Justifications for Admission:


Justification of Admission Dx:  N/A











LUIS FLORES DO                2020 14:34

## 2020-06-13 VITALS
SYSTOLIC BLOOD PRESSURE: 132 MMHG | SYSTOLIC BLOOD PRESSURE: 132 MMHG | DIASTOLIC BLOOD PRESSURE: 61 MMHG | DIASTOLIC BLOOD PRESSURE: 61 MMHG | SYSTOLIC BLOOD PRESSURE: 132 MMHG | DIASTOLIC BLOOD PRESSURE: 61 MMHG | DIASTOLIC BLOOD PRESSURE: 61 MMHG | DIASTOLIC BLOOD PRESSURE: 61 MMHG | SYSTOLIC BLOOD PRESSURE: 132 MMHG | DIASTOLIC BLOOD PRESSURE: 61 MMHG | DIASTOLIC BLOOD PRESSURE: 61 MMHG | SYSTOLIC BLOOD PRESSURE: 132 MMHG | DIASTOLIC BLOOD PRESSURE: 61 MMHG | SYSTOLIC BLOOD PRESSURE: 132 MMHG | SYSTOLIC BLOOD PRESSURE: 132 MMHG | SYSTOLIC BLOOD PRESSURE: 132 MMHG | DIASTOLIC BLOOD PRESSURE: 61 MMHG | SYSTOLIC BLOOD PRESSURE: 132 MMHG | SYSTOLIC BLOOD PRESSURE: 132 MMHG | DIASTOLIC BLOOD PRESSURE: 61 MMHG | SYSTOLIC BLOOD PRESSURE: 132 MMHG | DIASTOLIC BLOOD PRESSURE: 61 MMHG

## 2020-06-13 VITALS — DIASTOLIC BLOOD PRESSURE: 75 MMHG | SYSTOLIC BLOOD PRESSURE: 140 MMHG

## 2020-06-13 VITALS — SYSTOLIC BLOOD PRESSURE: 132 MMHG | DIASTOLIC BLOOD PRESSURE: 58 MMHG

## 2020-06-13 VITALS — DIASTOLIC BLOOD PRESSURE: 77 MMHG | SYSTOLIC BLOOD PRESSURE: 153 MMHG

## 2020-06-13 LAB
ALBUMIN SERPL-MCNC: 3.1 G/DL (ref 3.4–5)
ALBUMIN/GLOB SERPL: 0.9 {RATIO} (ref 1–1.7)
ALP SERPL-CCNC: 62 U/L (ref 46–116)
ALT SERPL-CCNC: 18 U/L (ref 14–59)
ANION GAP SERPL CALC-SCNC: 8 MMOL/L (ref 6–14)
AST SERPL-CCNC: 13 U/L (ref 15–37)
BASOPHILS # BLD AUTO: 0 X10^3/UL (ref 0–0.2)
BASOPHILS NFR BLD: 1 % (ref 0–3)
BILIRUB SERPL-MCNC: 0.4 MG/DL (ref 0.2–1)
BUN SERPL-MCNC: 8 MG/DL (ref 7–20)
BUN/CREAT SERPL: 10 (ref 6–20)
CALCIUM SERPL-MCNC: 8.5 MG/DL (ref 8.5–10.1)
CHLORIDE SERPL-SCNC: 107 MMOL/L (ref 98–107)
CO2 SERPL-SCNC: 28 MMOL/L (ref 21–32)
CREAT SERPL-MCNC: 0.8 MG/DL (ref 0.6–1)
EOSINOPHIL NFR BLD: 0.1 X10^3/UL (ref 0–0.7)
EOSINOPHIL NFR BLD: 1 % (ref 0–3)
ERYTHROCYTE [DISTWIDTH] IN BLOOD BY AUTOMATED COUNT: 20.3 % (ref 11.5–14.5)
GFR SERPLBLD BASED ON 1.73 SQ M-ARVRAT: 86.8 ML/MIN
GLOBULIN SER-MCNC: 3.3 G/DL (ref 2.2–3.8)
GLUCOSE SERPL-MCNC: 113 MG/DL (ref 70–99)
HCT VFR BLD CALC: 31.6 % (ref 36–47)
HGB BLD-MCNC: 9.8 G/DL (ref 12–15.5)
LYMPHOCYTES # BLD: 2.8 X10^3/UL (ref 1–4.8)
LYMPHOCYTES NFR BLD AUTO: 38 % (ref 24–48)
MCH RBC QN AUTO: 21 PG (ref 25–35)
MCHC RBC AUTO-ENTMCNC: 31 G/DL (ref 31–37)
MCV RBC AUTO: 68 FL (ref 79–100)
MONO #: 0.5 X10^3/UL (ref 0–1.1)
MONOCYTES NFR BLD: 7 % (ref 0–9)
NEUT #: 4.1 X10^3/UL (ref 1.8–7.7)
NEUTROPHILS NFR BLD AUTO: 54 % (ref 31–73)
PLATELET # BLD AUTO: 201 X10^3/UL (ref 140–400)
POTASSIUM SERPL-SCNC: 3.6 MMOL/L (ref 3.5–5.1)
PROT SERPL-MCNC: 6.4 G/DL (ref 6.4–8.2)
RBC # BLD AUTO: 4.64 X10^6/UL (ref 3.5–5.4)
SODIUM SERPL-SCNC: 143 MMOL/L (ref 136–145)
WBC # BLD AUTO: 7.6 X10^3/UL (ref 4–11)

## 2020-06-13 RX ADMIN — ZINC SULFATE CAP 220 MG (50 MG ELEMENTAL ZN) SCH MG: 220 (50 ZN) CAP at 08:06

## 2020-06-13 RX ADMIN — FOLIC ACID-PYRIDOXINE-CYANOCOBALAMIN TAB 2.5-25-2 MG SCH TAB: 2.5-25-2 TAB at 08:08

## 2020-06-13 RX ADMIN — ENOXAPARIN SODIUM SCH MG: 40 INJECTION SUBCUTANEOUS at 09:30

## 2020-06-13 RX ADMIN — DEXTROSE, SODIUM CHLORIDE, AND POTASSIUM CHLORIDE SCH MLS/HR: 5; .45; .15 INJECTION INTRAVENOUS at 02:53

## 2020-06-13 RX ADMIN — DEXTROSE, SODIUM CHLORIDE, AND POTASSIUM CHLORIDE SCH MLS/HR: 5; .45; .15 INJECTION INTRAVENOUS at 13:21

## 2020-06-13 RX ADMIN — OXYCODONE HYDROCHLORIDE AND ACETAMINOPHEN SCH MG: 500 TABLET ORAL at 08:08

## 2020-06-13 NOTE — PDOC
[employer] PROGRESS NOTE


Chief Complaint


Chief Complaint


Recent COVID-19 resolving


Post COVID-19 weakness and dizziness and overall not feeling well


History of:


Diabetes-Type II, High Cholesterol, Hypertension


Additional Past Medical Histor:  cataracts


Past Surgical History:  No Surgical History


Additional Past Surgical Histo:  RIGHT KNEE SX, R&L BREAST BX; left knee





History of Present Illness


History of Present Illness


6/13/2020


Patient seen and examined


Discussed with RN


She is overall doing well but we are still awaiting her COVID-19 testing


If that is negative I may let her go home this afternoon





Vitals/I&O


Vitals/I&O:





                                   Vital Signs








  Date Time  Temp Pulse Resp B/P (MAP) Pulse Ox O2 Delivery O2 Flow Rate FiO2


 


6/13/20 11:00 97.8 74 18 132/58 (82) 100 Room Air  





 97.8       














                                    I & O   


 


 6/12/20 6/12/20 6/13/20





 15:00 23:00 07:00


 


Intake Total 1100 ml 150 ml 400 ml


 


Balance 1100 ml 150 ml 400 ml











Physical Exam


General:  Alert, Cooperative, mild distress


Heart:  Regular rate


Lungs:  Clear


Abdomen:  Normal bowel sounds


Extremities:  No cyanosis, No edema


Skin:  No rashes





Labs


Labs:





Laboratory Tests








Test


 6/12/20


11:55 6/12/20


12:10 6/12/20


20:52 6/13/20


04:10


 


White Blood Count


 9.7 x10^3/uL


(4.0-11.0) 


 


 7.6 x10^3/uL


(4.0-11.0)


 


Red Blood Count


 4.58 x10^6/uL


(3.50-5.40) 


 


 4.64 x10^6/uL


(3.50-5.40)


 


Hemoglobin


 9.7 g/dL


(12.0-15.5) 


 


 9.8 g/dL


(12.0-15.5)


 


Hematocrit


 31.2 %


(36.0-47.0) 


 


 31.6 %


(36.0-47.0)


 


Mean Corpuscular Volume 68 fL ()    68 fL () 


 


Mean Corpuscular Hemoglobin 21 pg (25-35)    21 pg (25-35) 


 


Mean Corpuscular Hemoglobin


Concent 31 g/dL


(31-37) 


 


 31 g/dL


(31-37)


 


Red Cell Distribution Width


 20.2 %


(11.5-14.5) 


 


 20.3 %


(11.5-14.5)


 


Platelet Count


 210 x10^3/uL


(140-400) 


 


 201 x10^3/uL


(140-400)


 


Neutrophils (%) (Auto) 58 % (31-73)    54 % (31-73) 


 


Lymphocytes (%) (Auto) 37 % (24-48)    38 % (24-48) 


 


Monocytes (%) (Auto) 4 % (0-9)    7 % (0-9) 


 


Eosinophils (%) (Auto) 1 % (0-3)    1 % (0-3) 


 


Basophils (%) (Auto) 0 % (0-3)    1 % (0-3) 


 


Neutrophils # (Auto)


 5.7 x10^3/uL


(1.8-7.7) 


 


 4.1 x10^3/uL


(1.8-7.7)


 


Lymphocytes # (Auto)


 3.5 x10^3/uL


(1.0-4.8) 


 


 2.8 x10^3/uL


(1.0-4.8)


 


Monocytes # (Auto)


 0.4 x10^3/uL


(0.0-1.1) 


 


 0.5 x10^3/uL


(0.0-1.1)


 


Eosinophils # (Auto)


 0.1 x10^3/uL


(0.0-0.7) 


 


 0.1 x10^3/uL


(0.0-0.7)


 


Basophils # (Auto)


 0.0 x10^3/uL


(0.0-0.2) 


 


 0.0 x10^3/uL


(0.0-0.2)


 


Platelet Estimate


 Adequate


(ADEQUATE) 


 


 





 


Giant Platelets Occ    


 


Polychromasia Slight    


 


Hypochromasia Mod    


 


Anisocytosis Mod    


 


Microcytosis Marked    


 


Target Cells Occ    


 


Ovalocytes Few    


 


Prothrombin Time


 13.8 SEC


(11.7-14.0) 


 


 





 


Prothromb Time International


Ratio 1.1 (0.8-1.1) 


 


 


 





 


Activated Partial


Thromboplast Time 23 SEC (24-38) 


 


 


 





 


Sodium Level


 142 mmol/L


(136-145) 


 


 143 mmol/L


(136-145)


 


Potassium Level


 2.9 mmol/L


(3.5-5.1) 


 


 3.6 mmol/L


(3.5-5.1)


 


Chloride Level


 104 mmol/L


() 


 


 107 mmol/L


()


 


Carbon Dioxide Level


 25 mmol/L


(21-32) 


 


 28 mmol/L


(21-32)


 


Anion Gap 13 (6-14)    8 (6-14) 


 


Blood Urea Nitrogen


 14 mg/dL


(7-20) 


 


 8 mg/dL (7-20) 





 


Creatinine


 0.9 mg/dL


(0.6-1.0) 


 


 0.8 mg/dL


(0.6-1.0)


 


Estimated GFR


(Cockcroft-Gault) 75.8 


 


 


 86.8 





 


BUN/Creatinine Ratio 16 (6-20)    10 (6-20) 


 


Glucose Level


 218 mg/dL


(70-99) 


 


 113 mg/dL


(70-99)


 


Calcium Level


 8.4 mg/dL


(8.5-10.1) 


 


 8.5 mg/dL


(8.5-10.1)


 


Magnesium Level


 1.3 mg/dL


(1.8-2.4) 


 


 





 


Total Bilirubin


 0.5 mg/dL


(0.2-1.0) 


 


 0.4 mg/dL


(0.2-1.0)


 


Aspartate Amino Transf


(AST/SGOT) 12 U/L (15-37) 


 


 


 13 U/L (15-37) 





 


Alanine Aminotransferase


(ALT/SGPT) 17 U/L (14-59) 


 


 


 18 U/L (14-59) 





 


Alkaline Phosphatase


 64 U/L


() 


 


 62 U/L


()


 


Troponin I Quantitative


 < 0.017 ng/mL


(0.000-0.055) 


 


 





 


Total Protein


 6.6 g/dL


(6.4-8.2) 


 


 6.4 g/dL


(6.4-8.2)


 


Albumin


 3.3 g/dL


(3.4-5.0) 


 


 3.1 g/dL


(3.4-5.0)


 


Albumin/Globulin Ratio 1.0 (1.0-1.7)    0.9 (1.0-1.7) 


 


Lipase


 80 U/L


() 


 


 





 


Urine Collection Type  U cath   


 


Urine Color  Yellow   


 


Urine Clarity  Clear   


 


Urine pH  5.5 (<5.0-8.0)   


 


Urine Specific Gravity


 


 1.020


(1.000-1.030) 


 





 


Urine Protein


 


 100 mg/dL


(NEG-TRACE) 


 





 


Urine Glucose (UA)


 


 100 mg/dL


(NEG) 


 





 


Urine Ketones (Stick)  15 mg/dL (NEG)   


 


Urine Blood  Moderate (NEG)   


 


Urine Nitrite  Negative (NEG)   


 


Urine Bilirubin  Negative (NEG)   


 


Urine Urobilinogen Dipstick


 


 1.0 mg/dL (0.2


mg/dL) 


 





 


Urine Leukocyte Esterase  Negative (NEG)   


 


Urine RBC


 


 6-10 /HPF


(0-2) 


 





 


Urine WBC  1-4 /HPF (0-4)   


 


Urine Squamous Epithelial


Cells 


 Few /LPF 


 


 





 


Urine Transitional Epithelial


Cells 


 Mod /LPF 


 


 





 


Urine Amorphous Sediment  Present /HPF   


 


Urine Bacteria  0 /HPF (0-FEW)   


 


Urine Hyaline Casts  Moderate /HPF   


 


Urine Mucus  Marked /LPF   


 


Glucose (Fingerstick)


 


 


 128 mg/dL


(70-99) 





 


Iron Level


 


 


 


 32 ug/dL


()


 


Total Iron Binding Capacity


 


 


 


 214 ug/dL


(250-450)


 


Iron Saturation    15 % (15-34) 


 


Test


 6/13/20


07:30 6/13/20


10:57 


 





 


Glucose (Fingerstick)


 115 mg/dL


(70-99) 128 mg/dL


(70-99) 


 














Review of Systems


Review of Systems:


No new complaints states she feels a little better





Assessment and Plan


Assessmemt and Plan


Problems


Medical Problems:


(1) Dizziness


Status: Acute  





(2) Hypokalemia


Status: Acute  





(3) Hypomagnesemia syndrome


Status: Acute  





Recent COVID-19 resolving


Post COVID-19 weakness and dizziness and overall not feeling well


History of:


Diabetes-Type II, High Cholesterol, Hypertension


Additional Past Medical Histor:  cataracts


Past Surgical History:  No Surgical History


Additional Past Surgical Histo:  RIGHT KNEE SX, R&L BREAST BX; left knee





Plan


Awaiting Covid-19 testing


If that is negative she could probably go home


For now we will continue home meds DVT prophylaxis full code





Comment


Review of Relevant


I have reviewed the following items blanca (where applicable) has been applied.


Medications:





Current Medications








 Medications


  (Trade)  Dose


 Ordered  Sig/Faraz


 Route


 PRN Reason  Start Time


 Stop Time Status Last Admin


Dose Admin


 


 Potassium


 Chloride/Water  100 ml @ 


 100 mls/hr  Q1H


 IV


   6/12/20 12:45


 6/12/20 16:44 DC 6/12/20 15:45





 


 Magnesium Sulfate  50 ml @ 25


 mls/hr  1X  ONCE


 IV


   6/12/20 13:00


 6/12/20 14:59 DC 6/12/20 14:03





 


 Atorvastatin


 Calcium


  (Lipitor)  40 mg  QHS


 PO


   6/12/20 21:00


    6/12/20 20:41





 


 Clonazepam


  (KlonoPIN)  0.5 mg  PRN BID  PRN


 PO


 ANXIETY / AGITATION  6/12/20 17:30


    6/13/20 08:21





 


 Meclizine HCl


  (Antivert)  25 mg  PRN Q6HRS  PRN


 PO


 DIZZINESS  6/12/20 17:33


    6/12/20 17:57





 


 Citalopram


 Hydrobromide


  (CeleXA)  20 mg  DAILY


 PO


   6/13/20 09:00


    6/13/20 08:07





 


 Metformin HCl


  (Glucophage)  1,000 mg  BIDWMEALS


 PO


   6/12/20 18:00


    6/13/20 08:07





 


 Losartan Potassium


  (Cozaar)  100 mg  DAILY


 PO


   6/13/20 09:00


    6/13/20 08:07





 


 Potassium Chloride


  (Klor-Con)  20 meq  1X  ONCE


 PO


   6/12/20 17:30


 6/12/20 17:33 DC 6/12/20 17:57





 


 Hydrochlorothiazide


  (Microzide)  12.5 mg  DAILY


 PO


   6/13/20 09:00


    6/13/20 08:08





 


 Vitamin D


  (Vitamin D3)  5,000 unit  DAILY


 PO


   6/13/20 09:00


    6/13/20 08:07





 


 Enoxaparin Sodium


  (Lovenox 40mg


 Syringe)  40 mg  Q12HR


 SQ


   6/12/20 21:00


    6/13/20 09:30





 


 Vitamin B Complex


  (Folbic Tablet)  1 tab  DAILY


 PO


   6/12/20 18:15


    6/13/20 08:08





 


 Zinc Sulfate


  (Orazinc)  220 mg  DAILY


 PO


   6/12/20 18:15


    6/13/20 08:06





 


 Ascorbic Acid


  (Vitamin C)  500 mg  DAILY


 PO


   6/12/20 18:15


    6/13/20 08:08





 


 Potassium


 Chloride/Dextrose/


 Sod Cl  1,000 ml @ 


 100 mls/hr  Q10H


 IV


   6/12/20 18:15


    6/13/20 02:53





 


 Acetaminophen


  (Tylenol)  650 mg  PRN Q6HRS  PRN


 PO


 MILD PAIN / TEMP > 100.3'F  6/12/20 23:00


    6/12/20 23:26














Justicifation of Admission Dx:


Justifications for Admission:


Justification of Admission Dx:  Yes











SINDY APPIAH III DO           Jun 13, 2020 11:49

## 2020-06-13 NOTE — DS
DATE OF DISCHARGE:  06/13/2020



ADMISSION DIAGNOSES:  Hypokalemia, hypomagnesemia, dizziness, recent COVID-19

infection.



DISCHARGE DIAGNOSIS:  Resolving electrolyte disturbance.



HOSPITAL COURSE:  The patient is a pleasant elderly female who presented with

weakness and was noted to have hypokalemia and hypomagnesemia.  She was also

dizzy.  She did have COVID-19 recently and so she thought that might be playing

a role.  We admitted the patient, corrected her electrolytes.  This morning, I

saw and examined her.  She is doing well.  We still have COVID-19 test pending,

but if that is negative, I went ahead and put a discharge in for her to get home

and see her doctor in a week.



DISPOSITION:  Home.



ACTIVITY:  As tolerated.



DIET:  Low sodium.



MEDICATIONS:  Please see the MRAD.



TOTAL TIME:  32 minutes.

 



______________________________

LUZL YANET APPIAH DO



DR:  [email or web address]  JOB#:  [id number] / [id number]

DD:  06/13/2020 12:28  DT:  06/13/2020 12:42

## 2020-06-13 NOTE — NUR
Pt discharged home with assist by wheelchair to car. No complaints noted at this time. Pt 
verbalize understanding of discharge instructions. Pt instructed to call or report to ED 
with any complaints. Pt verbalize understanding.

## 2020-11-09 ENCOUNTER — HOSPITAL ENCOUNTER (OUTPATIENT)
Dept: HOSPITAL 61 - KCIC | Age: 65
End: 2020-11-09
Attending: NURSE PRACTITIONER
Payer: COMMERCIAL

## 2020-11-09 DIAGNOSIS — M25.572: ICD-10-CM

## 2020-11-09 DIAGNOSIS — M20.62: Primary | ICD-10-CM

## 2020-11-09 DIAGNOSIS — M25.512: ICD-10-CM

## 2020-11-09 DIAGNOSIS — M79.672: ICD-10-CM

## 2020-11-09 PROCEDURE — 73600 X-RAY EXAM OF ANKLE: CPT

## 2020-11-09 PROCEDURE — 73030 X-RAY EXAM OF SHOULDER: CPT

## 2020-11-09 PROCEDURE — 73630 X-RAY EXAM OF FOOT: CPT

## 2020-11-09 NOTE — KCIC
PROCEDURE: ANKLE LEFT 2V, FOOT LEFT 3V, SHOULDER 2+V LEFT

 

STUDY DATE: 11/9/2020

 

CLINICAL INDICATION / HISTORY: Reason: LEFT ANKLE PAIN / Spl. 

Instructions: Pain across top and anterior foot anf joint line since a 

fall 1 week ago. / History: .

 

TECHNIQUE: AP, lateral and oblique views of the left foot.

 

COMPARISON: None

 

FINDINGS: No fracture or dislocation is identified. Mild bunion deformity 

at the first MTP joint is present. The bone density is normal. The joint 

space widths are maintained, and there are no erosions to suggest an 

inflammatory arthropathy. No soft tissue abnormality is seen.

 

IMPRESSION: No acute osseous abnormality.

 

 

 

 

PROCEDURE: ANKLE LEFT 2V, FOOT LEFT 3V, SHOULDER 2+V LEFT

 

STUDY DATE: 11/9/2020

 

CLINICAL INDICATION / HISTORY: Reason: LEFT ANKLE PAIN / Spl. 

Instructions: Pain across top and anterior foot anf joint line since a 

fall 1 week ago. / History: .

 

TECHNIQUE: Left ankle 3 views.

 

COMPARISON: None

 

FINDINGS: The ankle mortise is approximated, and the talar dome is 

unremarkable. The joint space widths are maintained. No fracture or 

dislocation is identified. No soft tissue swelling along the anterior 

aspect of the left ankle joint is present..

 

IMPRESSION: No acute osseous abnormality. Soft tissue stranding anterior 

to the left ankle joint is suggestive of an ankle sprain. No malalignment.

 

 

PROCEDURE: ANKLE LEFT 2V, FOOT LEFT 3V, SHOULDER 2+V LEFT

 

STUDY DATE: 11/9/2020

 

CLINICAL INDICATION / HISTORY: Reason: LEFT ANKLE PAIN / Spl. 

Instructions: Pain across top and anterior foot anf joint line since a 

fall 1 week ago. / History: .

 

TECHNIQUE: AP internal and external rotation views with a Y- view were 

obtained.

 

COMPARISON: None

 

FINDINGS: No fracture, dislocation or bone destruction is identified. 

Small circumscribed partially imaged ossific density at the superior 

aspect of the glenoid rim is favored to be a loose body. Mild joint space 

narrowing of the glenohumeral joint is present. There are mild 

degenerative changes at the left AC joint. No calcifications are seen in 

relation to the rotator cuff insertion. 

 

IMPRESSION:  No fracture or dislocation. Mild degenerative changes in the 

glenohumeral and acromioclavicular joints with a possible small loose body

in the glenohumeral joint..

 

 

Electronically signed by: Sandhya Tarango MD (11/9/2020 12:29 PM) 

[id number]

## 2020-11-09 NOTE — [facility]
PROCEDURE: ANKLE LEFT 2V, FOOT LEFT 3V, SHOULDER 2+V LEFT

 

STUDY DATE: 11/9/2020

 

CLINICAL INDICATION / HISTORY: Reason: LEFT ANKLE PAIN / Spl. 

Instructions: Pain across top and anterior foot anf joint line since a 

fall 1 week ago. / History: .

 

TECHNIQUE: AP, lateral and oblique views of the left foot.

 

COMPARISON: None

 

FINDINGS: No fracture or dislocation is identified. Mild bunion deformity 

at the first MTP joint is present. The bone density is normal. The joint 

space widths are maintained, and there are no erosions to suggest an 

inflammatory arthropathy. No soft tissue abnormality is seen.

 

IMPRESSION: No acute osseous abnormality.

 

 

 

 

PROCEDURE: ANKLE LEFT 2V, FOOT LEFT 3V, SHOULDER 2+V LEFT

 

STUDY DATE: 11/9/2020

 

CLINICAL INDICATION / HISTORY: Reason: LEFT ANKLE PAIN / Spl. 

Instructions: Pain across top and anterior foot anf joint line since a 

fall 1 week ago. / History: .

 

TECHNIQUE: Left ankle 3 views.

 

COMPARISON: None

 

FINDINGS: The ankle mortise is approximated, and the talar dome is 

unremarkable. The joint space widths are maintained. No fracture or 

dislocation is identified. No soft tissue swelling along the anterior 

aspect of the left ankle joint is present..

 

IMPRESSION: No acute osseous abnormality. Soft tissue stranding anterior 

to the left ankle joint is suggestive of an ankle sprain. No malalignment.

 

 

PROCEDURE: ANKLE LEFT 2V, FOOT LEFT 3V, SHOULDER 2+V LEFT

 

STUDY DATE: 11/9/2020

 

CLINICAL INDICATION / HISTORY: Reason: LEFT ANKLE PAIN / Spl. 

Instructions: Pain across top and anterior foot anf joint line since a 

fall 1 week ago. / History: .

 

TECHNIQUE: AP internal and external rotation views with a Y- view were 

obtained.

 

COMPARISON: None

 

FINDINGS: No fracture, dislocation or bone destruction is identified. 

Small circumscribed partially imaged ossific density at the superior 

aspect of the glenoid rim is favored to be a loose body. Mild joint space 

narrowing of the glenohumeral joint is present. There are mild 

degenerative changes at the left AC joint. No calcifications are seen in 

relation to the rotator cuff insertion. 

 

IMPRESSION:  No fracture or dislocation. Mild degenerative changes in the 

glenohumeral and acromioclavicular joints with a possible small loose body

in the glenohumeral joint..

 

 

Electronically signed by: Sandhya Tarango MD (11/9/2020 12:29 PM) 

[id number]

## 2020-11-09 NOTE — [facility]
PROCEDURE: ANKLE LEFT 2V, FOOT LEFT 3V, SHOULDER 2+V LEFT

 

STUDY DATE: 11/9/2020

 

CLINICAL INDICATION / HISTORY: Reason: LEFT ANKLE PAIN / Spl. 

Instructions: Pain across top and anterior foot anf joint line since a 

fall 1 week ago. / History: .

 

TECHNIQUE: AP, lateral and oblique views of the left foot.

 

COMPARISON: None

 

FINDINGS: No fracture or dislocation is identified. Mild bunion deformity 

at the first MTP joint is present. The bone density is normal. The joint 

space widths are maintained, and there are no erosions to suggest an 

inflammatory arthropathy. No soft tissue abnormality is seen.

 

IMPRESSION: No acute osseous abnormality.

 

 

 

 

PROCEDURE: ANKLE LEFT 2V, FOOT LEFT 3V, SHOULDER 2+V LEFT

 

STUDY DATE: 11/9/2020

 

CLINICAL INDICATION / HISTORY: Reason: LEFT ANKLE PAIN / Spl. 

Instructions: Pain across top and anterior foot anf joint line since a 

fall 1 week ago. / History: .

 

TECHNIQUE: Left ankle 3 views.

 

COMPARISON: None

 

FINDINGS: The ankle mortise is approximated, and the talar dome is 

unremarkable. The joint space widths are maintained. No fracture or 

dislocation is identified. No soft tissue swelling along the anterior 

aspect of the left ankle joint is present..

 

IMPRESSION: No acute osseous abnormality. Soft tissue stranding anterior 

to the left ankle joint is suggestive of an ankle sprain. No malalignment.

 

 

PROCEDURE: ANKLE LEFT 2V, FOOT LEFT 3V, SHOULDER 2+V LEFT

 

STUDY DATE: 11/9/2020

 

CLINICAL INDICATION / HISTORY: Reason: LEFT ANKLE PAIN / Spl. 

Instructions: Pain across top and anterior foot anf joint line since a 

fall 1 week ago. / History: .

 

TECHNIQUE: AP internal and external rotation views with a Y- view were 

obtained.

 

COMPARISON: None

 

FINDINGS: No fracture, dislocation or bone destruction is identified. 

Small circumscribed partially imaged ossific density at the superior 

aspect of the glenoid rim is favored to be a loose body. Mild joint space 

narrowing of the glenohumeral joint is present. There are mild 

degenerative changes at the left AC joint. No calcifications are seen in 

relation to the rotator cuff insertion. 

 

IMPRESSION:  No fracture or dislocation. Mild degenerative changes in the 

glenohumeral and acromioclavicular joints with a possible small loose body

in the glenohumeral joint..

 

 

Electronically signed by: Sandhya Tarango MD (11/9/2020 12:29 PM) 

[id number]

## 2021-02-16 ENCOUNTER — HOSPITAL ENCOUNTER (OUTPATIENT)
Dept: HOSPITAL 61 - MAMMO | Age: 66
End: 2021-02-16
Attending: NURSE PRACTITIONER
Payer: COMMERCIAL

## 2021-02-16 DIAGNOSIS — R92.8: Primary | ICD-10-CM

## 2021-02-16 PROCEDURE — 77066 DX MAMMO INCL CAD BI: CPT

## 2021-02-16 PROCEDURE — 76641 ULTRASOUND BREAST COMPLETE: CPT

## 2021-02-16 NOTE — RAD
Examination:

1. Bilateral diagnostic mammogram

2. Limited right breast ultrasound



INDICATION: 65-year-old woman due for screening presents for follow-up of probably benign nodule in t
he superior right breast.



COMPARISON: Right digital diagnostic mammogram of 1/21/2020 and bilateral diagnostic mammogram of 7/2
3/2019. Right breast ultrasound of 1/21/2020



TECHNIQUE: Bilateral CC and MLO views were obtained with 2-D technique and reviewed with computer-aid
ed detection. Targeted ultrasound was subsequently pursued in the area of previous sonographic intere
st.



FINDINGS:

Scattered fibroglandular densities.



Scattered benign calcifications bilaterally are present.

Nodular parenchymal pattern compatible with benign cystic change is also noted along with bilateral l
ow axillary and intramammary lymph nodes.



No developing mass, suspicious calcification or architectural distortion is identified.



Targeted ultrasound of the right breast at the 2:30 o'clock position 2 cm from the nipple shows a wel
l circumscribed parallel orientation 3 mm nodule that is stable to marginally decreased from a previo
us breast ultrasound.



IMPRESSION:

Benign findings on bilateral mammogram and right breast ultrasound. No evidence of malignancy.



BI-RADS Category 2

Benign findings



Recommend return to routine screening next due in one year.



Patient entered into a reminder system with targeted due date for next mammogram.



Electronically signed by: Sandhya Tarango MD (2/16/2021 2:27 PM) [id number]

## 2021-02-16 NOTE — RAD
Examination:

1. Bilateral diagnostic mammogram

2. Limited right breast ultrasound



INDICATION: 65-year-old woman due for screening presents for follow-up of probably benign nodule in t
he superior right breast.



COMPARISON: Right digital diagnostic mammogram of 1/21/2020 and bilateral diagnostic mammogram of 7/2
3/2019. Right breast ultrasound of 1/21/2020



TECHNIQUE: Bilateral CC and MLO views were obtained with 2-D technique and reviewed with computer-aid
ed detection. Targeted ultrasound was subsequently pursued in the area of previous sonographic intere
st.



FINDINGS:

Scattered fibroglandular densities.



Scattered benign calcifications bilaterally are present.

Nodular parenchymal pattern compatible with benign cystic change is also noted along with bilateral l
ow axillary and intramammary lymph nodes.



No developing mass, suspicious calcification or architectural distortion is identified.



Targeted ultrasound of the right breast at the 2:30 o'clock position 2 cm from the nipple shows a wel
l circumscribed parallel orientation 3 mm nodule that is stable to marginally decreased from a previo
us breast ultrasound.



IMPRESSION:

Benign findings on bilateral mammogram and right breast ultrasound. No evidence of malignancy.



BI-RADS Category 2

Benign findings



Recommend return to routine screening next due in one year.



Patient entered into a reminder system with targeted due date for next mammogram.



Electronically signed by: Sandhya Tarango MD (2/16/2021 2:27 PM) [id number]

## 2021-03-09 ENCOUNTER — HOSPITAL ENCOUNTER (OUTPATIENT)
Dept: HOSPITAL 61 - KCIC US | Age: 66
End: 2021-03-09
Attending: NURSE PRACTITIONER
Payer: COMMERCIAL

## 2021-03-09 DIAGNOSIS — D25.9: Primary | ICD-10-CM

## 2021-03-09 PROCEDURE — 76830 TRANSVAGINAL US NON-OB: CPT

## 2021-03-09 PROCEDURE — 76856 US EXAM PELVIC COMPLETE: CPT

## 2021-03-09 NOTE — [facility]
EXAM: Pelvic sonogram.



HISTORY: Pelvic pressure.



TECHNIQUE: Sonographic imaging of the pelvis was performed.



COMPARISON: None.



FINDINGS: The uterus measures 7.0 x 6.1 x 4.3 cm. There is suboptimal evaluation of the uterus due to
 shadowing from calcified fibroids. The endometrial stripe is not seen. The ovaries are obscured. The
re is no pelvic free fluid.



IMPRESSION:

1. Calcified uterine fibroids, with evaluation of the uterus. These are better characterized on the C
T performed 3/24/2020.

2. Obscured ovaries.



Electronically signed by: Janine Fernandez MD (3/9/2021 4:04 PM) [id number]

## 2021-07-19 ENCOUNTER — HOSPITAL ENCOUNTER (OUTPATIENT)
Dept: HOSPITAL 61 - KCIC | Age: 66
End: 2021-07-19
Attending: NURSE PRACTITIONER
Payer: COMMERCIAL

## 2021-07-19 DIAGNOSIS — R06.02: ICD-10-CM

## 2021-07-19 DIAGNOSIS — I10: Primary | ICD-10-CM

## 2021-07-19 PROCEDURE — 71046 X-RAY EXAM CHEST 2 VIEWS: CPT

## 2021-07-19 NOTE — [facility]
EXAMINATION: Chest radiograph.



VIEWS: 2



COMPARISON: None



INDICATION:66 years, Female, essentially hypertension, shortness of breath, Covid 19.



FINDINGS: 

Normal cardiomediastinal silhouette. No focal consolidation. No pleural effusion or pneumothorax. No 
acute osseous process.



IMPRESSION:

No acute cardiopulmonary process.



Electronically signed by: Augustin Coughlin MD (7/19/2021 11:01 AM) [id number]

## 2021-09-13 ENCOUNTER — HOSPITAL ENCOUNTER (OUTPATIENT)
Dept: HOSPITAL 61 - LAB | Age: 66
End: 2021-09-13
Attending: INTERNAL MEDICINE
Payer: COMMERCIAL

## 2021-09-13 DIAGNOSIS — R06.02: Primary | ICD-10-CM

## 2021-09-13 LAB
ANION GAP SERPL CALC-SCNC: 6 MMOL/L (ref 6–14)
ANISOCYTOSIS BLD QL SMEAR: PRESENT
BASOPHILS # BLD AUTO: 0 X10^3/UL (ref 0–0.2)
BASOPHILS NFR BLD: 1 % (ref 0–3)
BUN SERPL-MCNC: 13 MG/DL (ref 7–20)
CALCIUM SERPL-MCNC: 9.5 MG/DL (ref 8.5–10.1)
CHLORIDE SERPL-SCNC: 102 MMOL/L (ref 98–107)
CO2 SERPL-SCNC: 33 MMOL/L (ref 21–32)
CREAT SERPL-MCNC: 0.8 MG/DL (ref 0.6–1)
EOSINOPHIL NFR BLD: 0.1 X10^3/UL (ref 0–0.7)
EOSINOPHIL NFR BLD: 1 % (ref 0–3)
ERYTHROCYTE [DISTWIDTH] IN BLOOD BY AUTOMATED COUNT: 19.1 % (ref 11.5–14.5)
GFR SERPLBLD BASED ON 1.73 SQ M-ARVRAT: 86.3 ML/MIN
GLUCOSE SERPL-MCNC: 120 MG/DL (ref 70–99)
HCT VFR BLD CALC: 33.5 % (ref 36–47)
HGB BLD-MCNC: 10.6 G/DL (ref 12–15.5)
HYPOCHROMIA BLD QL SMEAR: (no result)
LYMPHOCYTES # BLD: 2.8 X10^3/UL (ref 1–4.8)
LYMPHOCYTES NFR BLD AUTO: 46 % (ref 24–48)
MCH RBC QN AUTO: 21 PG (ref 25–35)
MCHC RBC AUTO-ENTMCNC: 32 G/DL (ref 31–37)
MCV RBC AUTO: 66 FL (ref 79–100)
MICROCYTES BLD QL SMEAR: (no result)
MONO #: 0.4 X10^3/UL (ref 0–1.1)
MONOCYTES NFR BLD: 7 % (ref 0–9)
NEUT #: 2.8 X10^3/UL (ref 1.8–7.7)
NEUTROPHILS NFR BLD AUTO: 45 % (ref 31–73)
OVALOCYTES BLD QL SMEAR: (no result)
PLATELET # BLD AUTO: 227 X10^3/UL (ref 140–400)
PLATELET # BLD EST: ADEQUATE 10*3/UL
POIKILOCYTOSIS BLD QL SMEAR: PRESENT
POTASSIUM SERPL-SCNC: 4.3 MMOL/L (ref 3.5–5.1)
RBC # BLD AUTO: 5.05 X10^6/UL (ref 3.5–5.4)
SODIUM SERPL-SCNC: 141 MMOL/L (ref 136–145)
TARGETS BLD QL SMEAR: (no result)
WBC # BLD AUTO: 6.1 X10^3/UL (ref 4–11)

## 2021-09-13 PROCEDURE — 80048 BASIC METABOLIC PNL TOTAL CA: CPT

## 2021-09-13 PROCEDURE — 84443 ASSAY THYROID STIM HORMONE: CPT

## 2021-09-13 PROCEDURE — 36415 COLL VENOUS BLD VENIPUNCTURE: CPT

## 2021-09-13 PROCEDURE — 85025 COMPLETE CBC W/AUTO DIFF WBC: CPT

## 2021-09-23 ENCOUNTER — HOSPITAL ENCOUNTER (OUTPATIENT)
Dept: HOSPITAL 61 - ECHO | Age: 66
End: 2021-09-23
Attending: INTERNAL MEDICINE
Payer: COMMERCIAL

## 2021-09-23 DIAGNOSIS — J45.909: ICD-10-CM

## 2021-09-23 DIAGNOSIS — I10: ICD-10-CM

## 2021-09-23 DIAGNOSIS — R06.02: ICD-10-CM

## 2021-09-23 DIAGNOSIS — E78.5: ICD-10-CM

## 2021-09-23 DIAGNOSIS — R07.9: ICD-10-CM

## 2021-09-23 DIAGNOSIS — E11.9: ICD-10-CM

## 2021-09-23 DIAGNOSIS — I07.1: Primary | ICD-10-CM

## 2021-09-23 PROCEDURE — 93306 TTE W/DOPPLER COMPLETE: CPT

## 2021-09-23 NOTE — CARD
MR#: [id number]

Account#: [id number]

Accession#: [id number]

Date of Study: 09/23/2021

Ordering Physician: SHYAM MUNIZ, 

Referring Physician: SHYAM MUNIZ, 

Tech: Latanya Tyson, [facility]





--------------- APPROVED REPORT --------------





EXAM: Two-dimensional and M-mode echocardiogram with Doppler and color Doppler.



Other Information 

Quality : AverageHR: 76bpm



INDICATION

Dyspnea 

Chest Pain 



RISK FACTORS

Hypertension 

Hyperlipidemia

Diabetes

Asthma



2D DIMENSIONS 

Left Atrium(2D)4.0 (1.6-4.0cm)IVSd1.0 (0.7-1.1cm)

Aortic Root(2D)2.9 (2.0-3.7cm)LVDd4.8 (3.9-5.9cm)

LVOT Diameter2.0 (1.8-2.4cm)PWd0.9 (0.7-1.1cm)

LVDs2.8 (2.5-4.0cm)FS (%) 41.6 %

SV78.7 mlLVEF(%)72.4 (>50%)



Aortic Valve

AoV Peak Rolando.182.1cm/sAoV VTI36.9cm

AO Peak GR.13.3mmHgLVOT Peak Rolando.109.9cm/s

AO Mean GR.6mmHgAVA (VMAX)1.86cm2



Mitral Valve

MV E Nnfvsvgl69.2cm/sMV E Peak Gr.3mmHg

MV DECEL EDBI108imDE A Dorbnxas87.6cm/s

MV E Mean Gr.2mmHgE/A  Ratio0.8



Pulmonary Valve

PV Peak Yysqjefa63.0cm/s



Tricuspid Valve

TR P. Vnzpcqsi336tn/sRAP VWBIRGLP4qeLi

TR Peak Gr.20ygNxVNDJ12qpWi



Pulmonary Vein

S1 Bqcrtefr38.8cm/sD2 Rczvfwvu42.1cm/s

PVa ksygssst773xjpu



 LEFT VENTRICLE 

The left ventricle is normal size. There is normal left ventricular wall thickness. The left ventricu
lar systolic function is normal. The Ejection Fraction is 55-60%. There is normal LV segmental wall m
otion. Transmitral Doppler flow pattern is Grade I-abnormal relaxation pattern.



 RIGHT VENTRICLE 

The right ventricle is normal size. There is normal right ventricular wall thickness. The right ventr
icular systolic function is normal.



 ATRIA 

The left atrium size is normal. The right atrium size is normal. The interatrial septum is intact wit
h no evidence for an atrial septal defect or patent foramen ovale as noted on 2-D or Doppler imaging.




 AORTIC VALVE 

The aortic valve is normal in structure and function. Doppler and Color Flow revealed no significant 
aortic regurgitation. There is no significant aortic valvular stenosis. Calculated aortic valve area 
is 2.2 cm2 with maximum pressure gradient of 13 mmHg and mean pressure gradient of 6 mmHg.



 MITRAL VALVE 

The mitral valve is normal in structure and function. There is no evidence of mitral valve prolapse. 
There is no mitral valve stenosis. Doppler and Color-flow revealed trace mitral regurgitation.



 TRICUSPID VALVE 

The tricuspid valve is normal in structure and function. Doppler and Color Flow revealed trace to mil
d tricuspid regurgitation There is no tricuspid valve stenosis.



 GREAT VESSELS 

The aortic root is normal in size. The ascending aorta is normal in size. The IVC is normal in size a
nd collapses >50% with inspiration.



 PERICARDIAL EFFUSION 

There is no evidence of significant pericardial effusion.



Critical Notification

Critical Value: No



<Conclusion>

The left ventricular systolic function is normal.

The Ejection Fraction is 55-60%.

There is normal LV segmental wall motion.

Transmitral Doppler flow pattern is Grade I-abnormal relaxation pattern.

Trace mitral regurgitation.

Trace to mild tricuspid regurgitation

There is no evidence of significant pericardial effusion.



Signed by : Chacorta Page, 

Electronically Approved : 09/23/2021 17:03:52

## 2021-09-27 ENCOUNTER — HOSPITAL ENCOUNTER (OUTPATIENT)
Dept: HOSPITAL 61 - CT | Age: 66
End: 2021-09-27
Attending: INTERNAL MEDICINE
Payer: COMMERCIAL

## 2021-09-27 DIAGNOSIS — K75.3: ICD-10-CM

## 2021-09-27 DIAGNOSIS — I25.10: ICD-10-CM

## 2021-09-27 DIAGNOSIS — D73.89: ICD-10-CM

## 2021-09-27 DIAGNOSIS — R59.0: ICD-10-CM

## 2021-09-27 DIAGNOSIS — J12.89: ICD-10-CM

## 2021-09-27 DIAGNOSIS — I70.0: ICD-10-CM

## 2021-09-27 DIAGNOSIS — J84.10: ICD-10-CM

## 2021-09-27 DIAGNOSIS — U07.1: Primary | ICD-10-CM

## 2021-09-27 PROCEDURE — 71250 CT THORAX DX C-: CPT

## 2021-09-27 NOTE — RAD
EXAM: CT CHEST WITHOUT CONTRAST



HISTORY: Shortness of breath post Covid, pneumonia



COMPARISON: CT chest 3/24/2020



TECHNIQUE:  Helical CT of the chest performed without contrast. Coronal and sagittal reformats were o
btained.



One or more of the following individualized dose reduction techniques were utilized for this examinat
ion:  



1. Automated exposure control

2. Adjustment of the mA and/or kV according to patient size

3. Use of iterative reconstruction technique.



FINDINGS: 



Thyroid gland and thoracic inlet: Thyroid gland is normal.



Heart and great vessels: Heart is normal in size. No pericardial effusion. There are coronary artery 
calcifications. The thoracic aorta is calcified.



Mediastinum and mauricio: Calcified right hilar lymph nodes are unchanged. No mediastinal lymphadenopathy
.



Lungs and pleura: A calcified granuloma in the left lower lobe is unchanged. Scattered bilateral grou
nd glass opacities have resolved. Central airways are clear. No pleural effusion.



Chest wall and axillae: No axillary lymphadenopathy.



Upper abdomen: There are calcified splenic and hepatic granulomas.



Bones: No acute osseous abnormality.



IMPRESSION: 



1.  Interval resolution of pulmonary opacities. No new abnormality.

2.  Sequela of granulomatous disease.



Electronically signed by: Brisa Roth MD (9/27/2021 4:39 PM) [id number]

## 2021-09-29 ENCOUNTER — HOSPITAL ENCOUNTER (OUTPATIENT)
Dept: HOSPITAL 61 - NM | Age: 66
End: 2021-09-29
Attending: INTERNAL MEDICINE
Payer: COMMERCIAL

## 2021-09-29 DIAGNOSIS — R06.02: ICD-10-CM

## 2021-09-29 DIAGNOSIS — R07.9: Primary | ICD-10-CM

## 2021-09-29 PROCEDURE — 93017 CV STRESS TEST TRACING ONLY: CPT

## 2021-09-29 NOTE — RAD
MR#: [id number]

Account#: [id number]

Accession#: [id number]

Date of Study: 09/29/2021

Ordering Physician: SHYAM MUNIZ 

Referring Physician: TICO GALLARDO Tech: 





--------------- APPROVED REPORT --------------





Test Type:          Exercise

Stress Nurse/Tech: Lady Lane R.N.

Test Indications: OTERO

Cardiac History: asthma, htn, dm

Medications:     See Electronic Medical Record

Medical History: See Electronic Medical Record

Resting ECG:     SR

Resting Heart Rate: 78 bpm

Resting Blood Pressure: 124/68mmHg

Pretest Chest Pain: No chest pain



Nurse/Tech Notes

lungs cta, heart tones regular



Stress Symptoms

No chest pain or symptoms.



POST EXERCISE

Reason for Termination: Reached target heart rate

Target HR: Yes

Max HR: 147 bpm

97% of Maximum Predicted HR: 152 bpm

Exercise duration: 4:37 min:sec, 2 Stage

Exercise capacity: 4.6METs

Max Blood Pressure: 172/82mmHg

Blood Pressure response to exercise: Normal blood pressure response during stress.

Heart Rate response to exercise: normal

Chest Pain: No. 

Arrhythmia: No. 



INTERPRETATION

Stress EKG Conclusion: The resting EKG showed a sinus rhythm with mild nonspecific ST-T wave changes.


The stress EKG showed no significant change from baseline.

No EKG evidence of stress-induced ischemia.



Conclusion

1. Reasonably good exercise tolerance with the patient walking for 4 minutes and 37 seconds on a Bruc
e protocol.

2. Appropriate heart rate and blood pressure response to exertion.

3. No reported chest pain with exertion.

4. No EKG evidence of stress-induced ischemia.

5. Moderately low to low risk exercise treadmill stress test.



Signed by : Ernesto Galloway MD

Electronically Approved : 09/29/2021 12:22:35

## 2022-01-24 ENCOUNTER — HOSPITAL ENCOUNTER (OUTPATIENT)
Dept: HOSPITAL 61 - KCIC | Age: 67
End: 2022-01-24
Attending: NURSE PRACTITIONER
Payer: COMMERCIAL

## 2022-01-24 DIAGNOSIS — M79.644: Primary | ICD-10-CM

## 2022-01-24 PROCEDURE — 73130 X-RAY EXAM OF HAND: CPT

## 2022-01-24 NOTE — [facility]
EXAM: Right hand, 3 views.



HISTORY: Pain.



COMPARISON: None.



FINDINGS: 3 views of the right hand are obtained. There is no fracture, dislocation or subluxation. T
here is no suspicious osseous lesion. There is no foreign body.



IMPRESSION: No acute osseous finding.



Electronically signed by: Janine Fernandez MD (1/24/2022 1:31 PM) [id number]

## 2022-03-23 ENCOUNTER — HOSPITAL ENCOUNTER (OUTPATIENT)
Dept: HOSPITAL 61 - KCIC MAMMO | Age: 67
End: 2022-03-23
Attending: NURSE PRACTITIONER
Payer: COMMERCIAL

## 2022-03-23 DIAGNOSIS — Z12.31: Primary | ICD-10-CM

## 2022-03-23 PROCEDURE — 77067 SCR MAMMO BI INCL CAD: CPT

## 2022-03-23 NOTE — [facility]
Digital Mammogram Bilateral



History:  Routine screening



Technique:   2-D digital CC and MLO views were obtained.   CAD - computer aided detection was utilize
d. 



Comparison:  Mammograms from 2/16/2021, 1/21/2020, the 7/23/2019, and 4/10/2018..



Findings:

Breast Tissue Density B : There are scattered areas of fibroglandular density



There are no suspicious masses, malignant appearing calcifications, or areas of architectural distort
ion. There are multiple similar groups of calcifications in both breasts which are consistent with fi
broadenomas/fibroadenomatoid change.



Impression:

No evidence of malignancy.



Assessment: BI-RADS 2. Benign findings.



Recommendation: Routine screening mammograms..



The patient will receive a letter with the results in the mail. Patient information will be entered i
nto the mammography reminder system with a target recall date for the next mammogram. A reminder william
er will be generated.



Electronically signed by: Trupti Gibson MD (3/23/2022 1:02 PM) [id number]